# Patient Record
Sex: FEMALE | Employment: FULL TIME | URBAN - METROPOLITAN AREA
[De-identification: names, ages, dates, MRNs, and addresses within clinical notes are randomized per-mention and may not be internally consistent; named-entity substitution may affect disease eponyms.]

---

## 2017-07-26 ENCOUNTER — OFFICE VISIT - HEALTHEAST (OUTPATIENT)
Dept: FAMILY MEDICINE | Facility: CLINIC | Age: 47
End: 2017-07-26

## 2017-07-26 ENCOUNTER — RECORDS - HEALTHEAST (OUTPATIENT)
Dept: GENERAL RADIOLOGY | Facility: CLINIC | Age: 47
End: 2017-07-26

## 2017-07-26 DIAGNOSIS — E78.5 HYPERLIPIDEMIA: ICD-10-CM

## 2017-07-26 DIAGNOSIS — M25.562 PAIN IN LEFT KNEE: ICD-10-CM

## 2017-07-26 DIAGNOSIS — Z90.49 HX OF CHOLECYSTECTOMY: ICD-10-CM

## 2017-07-26 DIAGNOSIS — M25.561 PAIN IN RIGHT KNEE: ICD-10-CM

## 2017-07-26 DIAGNOSIS — M25.561 BILATERAL KNEE PAIN: ICD-10-CM

## 2017-07-26 DIAGNOSIS — M25.562 BILATERAL KNEE PAIN: ICD-10-CM

## 2017-07-26 LAB — LDLC SERPL CALC-MCNC: 118 MG/DL

## 2017-07-26 ASSESSMENT — MIFFLIN-ST. JEOR: SCORE: 1559.57

## 2017-08-01 ENCOUNTER — COMMUNICATION - HEALTHEAST (OUTPATIENT)
Dept: FAMILY MEDICINE | Facility: CLINIC | Age: 47
End: 2017-08-01

## 2018-12-19 ENCOUNTER — COMMUNICATION - HEALTHEAST (OUTPATIENT)
Dept: NURSING | Facility: CLINIC | Age: 48
End: 2018-12-19

## 2019-01-11 ENCOUNTER — COMMUNICATION - HEALTHEAST (OUTPATIENT)
Dept: NURSING | Facility: CLINIC | Age: 49
End: 2019-01-11

## 2021-05-31 VITALS — HEIGHT: 65 IN | WEIGHT: 212 LBS | BODY MASS INDEX: 35.32 KG/M2

## 2021-06-12 NOTE — PROGRESS NOTES
"Subjective: This patient comes in the clinic for the first time.  She is a 47-year-old female.  She has been at the [facility] prior to this.    Patient's has had previous surgery with cholecystectomy she has had 3 normal spontaneous vaginal deliveries.    She is a non-smoker no alcohol    She works at [employer] she is on her feet 8 hours a day.  She comes in today for evaluation regarding her knees.  She has bilateral knee pain.  X-ray today was obtained please see below.    Patient is not fasting she wanted cholesterol checked I did check LDL direct.  Came back at 118.    Patient's also had some pain in through her elbow right shoulder right medial epicondyles.    Regarding the knees it is worse on the left than the right.  I did get bilateral knee x-rays.    She has tried some over-the-counter naproxen which is helped a little bit.    Tobacco status: She  reports that she has never smoked. She has never used smokeless tobacco.    There are no active problems to display for this patient.      Current Outpatient Prescriptions   Medication Sig Dispense Refill     naproxen (NAPROSYN) 500 MG tablet Take 1 tablet (500 mg total) by mouth 2 (two) times a day with meals. 40 tablet 2     No current facility-administered medications for this visit.        ROS:   10 point review of systems positive as outlined otherwise negative    Objective:    /78 (Patient Site: Left Arm, Patient Position: Sitting, Cuff Size: Adult Large)  Pulse 80  Temp 97.9  F (36.6  C) (Oral)   Resp 20  Ht 5' 4.5\" (1.638 m)  Wt 212 lb (96.2 kg)  LMP  (Approximate)  BMI 35.83 kg/m2  Body mass index is 35.83 kg/(m^2).      General appearance no acute distress    Weight is 212    Vitals otherwise stable    Neck was negative supple oropharynx was clear    Lungs are clear no rales or rhonchi, heart regular S1-S2 rate is in the 80s    No abdominal pain    Joint exam no effusions no synovitis.  She has some pain with rotation movements of " the right anterior glenohumeral joint.  Also some tenderness over the right medial epicondyle.  I discussed lifting with palms to home.    Patient has some pain along the joint lines bilaterally.  X-ray showed some osteophytes and evidence of possible pseudogout, CPPD, versus atypical osteoarthritis.    LDL as outlined below    Results for orders placed or performed in visit on 07/26/17   LDL Cholesterol, Direct   Result Value Ref Range    Direct  <=129 mg/dl       Assessment:  1. Bilateral knee pain  XR Knees Bilateral 1 Or 2 VWS    naproxen (NAPROSYN) 500 MG tablet   2. Hx of cholecystectomy     3. Hyperlipidemia  LDL Cholesterol, Direct   4.  Mild right medial epicondylitis naproxen should help, appropriate lifting discussed.  5.  Right shoulder pain monitor  Bilateral knee pain possible pseudogout will treat with naproxen 500 mg twice daily consider cortisone injection.  If she stays on naproxen will need BMP.  She states should be coming in for a physical/Pap with 1 of the females here and can get the blood tests then.    LDL satisfactory continue good diet low-fat.    Previous cholecystectomy.            Plan: As outlined above    This transcription uses voice recognition software, which may contain typographical errors.

## 2021-06-22 NOTE — PROGRESS NOTES
Attempt 1:  Care guide left a message for the patient about [facility] enrollment.  If the patient is trying to call the Care guide back transfer them to Anthony Degroot at [phone].    The patient does not have a primary care provider, and the only encounters for the patient are showing with Dr. Clinton at [facility]. Care guide will try calling again.    Next Outreach: 12/26/18

## 2021-06-23 NOTE — PROGRESS NOTES
Attempt 1: Care Guide called patient.  If this patient is returning my call, please transfer to [facility] at ext [phone].      [facility] coverage enrollment     [facility]   [phone]  Clinic Care Coordinator

## 2021-11-02 ENCOUNTER — APPOINTMENT (OUTPATIENT)
Dept: INTERPRETER SERVICES | Facility: CLINIC | Age: 51
End: 2021-11-02

## 2021-11-17 ENCOUNTER — OFFICE VISIT (OUTPATIENT)
Dept: FAMILY MEDICINE | Facility: CLINIC | Age: 51
End: 2021-11-17

## 2021-11-17 VITALS
WEIGHT: 212 LBS | BODY MASS INDEX: 35.32 KG/M2 | OXYGEN SATURATION: 99 % | DIASTOLIC BLOOD PRESSURE: 79 MMHG | HEART RATE: 73 BPM | SYSTOLIC BLOOD PRESSURE: 150 MMHG | HEIGHT: 65 IN | RESPIRATION RATE: 16 BRPM

## 2021-11-17 DIAGNOSIS — M25.562 ACUTE PAIN OF LEFT KNEE: Primary | ICD-10-CM

## 2021-11-17 PROCEDURE — 99203 OFFICE O/P NEW LOW 30 MIN: CPT | Performed by: FAMILY MEDICINE

## 2021-11-17 RX ORDER — SODIUM PHOSPHATE,MONO-DIBASIC 19G-7G/118
1 ENEMA (ML) RECTAL DAILY
COMMUNITY

## 2021-11-17 RX ORDER — MELOXICAM 7.5 MG/1
7.5 TABLET ORAL DAILY PRN
Qty: 30 TABLET | Refills: 1 | Status: SHIPPED | OUTPATIENT
Start: 2021-11-17

## 2021-11-17 ASSESSMENT — MIFFLIN-ST. JEOR: SCORE: 1560.63

## 2021-11-17 NOTE — PROGRESS NOTES
"Assessment & Plan    1. Acute pain of left knee  This is a 52 yo female with left knee pain - no injury associated with the pain - xray from 2017 reviewed - mild changes.  Repeat xray today - midl degenerative changes.  Will treat with Meloxicam daily for pain.  Will refer for Ortho evaluation - patient notes she doesn't have insurance - will refer for care coordination help.    - XR Knee Left 3 Views; Future  - meloxicam (MOBIC) 7.5 MG tablet; Take 1 tablet (7.5 mg) by mouth daily as needed for pain  Dispense: 30 tablet; Refill: 1  - Orthopedic  Referral; Future  - Care Coordination Referral; Future      Return in about 4 weeks (around 12/15/2021) for if not getting better.    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  COMPARISON: None.     FINDINGS: Bilateral patellofemoral and lateral compartment osteophytes without significant joint space narrowing. This could represent CPPD, or atypical osteoarthritis.     No fracture or knee joint effusion.     This report was electronically interpreted by: Dr. Nasir Berrios MD ON 07/26/2017 at 16:12    Didn't take ibuprofen - because the pain was \"too big\" for ibuprofen  Did take it a couple days -       Knee Pain  Associated symptoms include arthralgias (left knee - no swelling).        There is no problem list on file for this patient.       No past medical history on file.     Current Outpatient Medications   Medication     glucosamine-chondroitin " "500-400 MG CAPS per capsule     meloxicam (MOBIC) 7.5 MG tablet     No current facility-administered medications for this visit.        No past surgical history on file.     Social History     Socioeconomic History     Marital status: Single     Spouse name: Not on file     Number of children: Not on file     Years of education: Not on file     Highest education level: Not on file   Occupational History     Not on file   Tobacco Use     Smoking status: Never Smoker     Smokeless tobacco: Never Used   Substance and Sexual Activity     Alcohol use: Not on file     Drug use: Not on file     Sexual activity: Not on file   Other Topics Concern     Not on file   Social History Narrative     Not on file     Social Determinants of Health     Financial Resource Strain: Not on file   Food Insecurity: Not on file   Transportation Needs: Not on file   Physical Activity: Not on file   Stress: Not on file   Social Connections: Not on file   Intimate Partner Violence: Not on file   Housing Stability: Not on file        No family history on file.     Review of Systems   Musculoskeletal: Positive for arthralgias (left knee - no swelling).   All other systems reviewed and are negative.       BP (!) 150/79 (BP Location: Left arm, Patient Position: Sitting, Cuff Size: Adult Large)   Pulse 73   Resp 16   Ht 1.64 m (5' 4.57\")   Wt 96.2 kg (212 lb)   SpO2 99%   BMI 35.75 kg/m       Physical Exam  Constitutional:       General: She is not in acute distress.     Appearance: She is well-developed.   HENT:      Right Ear: Tympanic membrane and external ear normal.      Left Ear: Tympanic membrane and external ear normal.      Nose: Nose normal.      Mouth/Throat:      Pharynx: No oropharyngeal exudate.   Eyes:      General:         Right eye: No discharge.         Left eye: No discharge.      Conjunctiva/sclera: Conjunctivae normal.      Pupils: Pupils are equal, round, and reactive to light.   Neck:      Thyroid: No thyromegaly.      " Trachea: No tracheal deviation.   Cardiovascular:      Rate and Rhythm: Normal rate and regular rhythm.      Pulses: Normal pulses.      Heart sounds: Normal heart sounds, S1 normal and S2 normal. No murmur heard.  No friction rub. No S3 or S4 sounds.    Pulmonary:      Effort: Pulmonary effort is normal. No respiratory distress.      Breath sounds: Normal breath sounds. No wheezing or rales.   Abdominal:      General: Bowel sounds are normal.      Palpations: Abdomen is soft. There is no mass.      Tenderness: There is no abdominal tenderness.   Musculoskeletal:         General: Normal range of motion.      Cervical back: Neck supple.      Comments: Left knee - joint line pain - pain with challenge to patella ; no effusion/swelling   Lymphadenopathy:      Cervical: No cervical adenopathy.   Skin:     General: Skin is warm and dry.      Findings: No rash.   Neurological:      Mental Status: She is alert and oriented to person, place, and time.      Motor: No abnormal muscle tone.      Deep Tendon Reflexes: Reflexes are normal and symmetric.   Psychiatric:         Thought Content: Thought content normal.         Judgment: Judgment normal.          Results:  Results for orders placed or performed in visit on 11/17/21   XR Knee Left 3 Views     Status: None    Narrative    EXAM: XR KNEE LEFT 3 VIEWS  LOCATION: [facility]  DATE/TIME: 11/17/2021 2:53 PM    INDICATION:  Acute pain of left knee  COMPARISON: None.      Impression    IMPRESSION: No fracture or joint effusion. Mild degenerative changes in the medial and lateral compartments. Moderate degenerative changes in the patellofemoral compartment.       Medications at Conclusion of Visit:  Current Outpatient Medications   Medication Sig Dispense Refill     glucosamine-chondroitin 500-400 MG CAPS per capsule Take 1 capsule by mouth daily       meloxicam (MOBIC) 7.5 MG tablet Take 1 tablet (7.5 mg) by mouth daily as needed for pain 30 tablet 1          KRISTINA VU MD

## 2021-11-17 NOTE — PATIENT INSTRUCTIONS
[facility]:    [address] [place]  [address]  Appointments: [phone]  OrthoQUICK: [phone] (don't need an appointment)  Mask required: All patients must wear a mask  bandana  or scarf before entering the building.

## 2021-11-18 ENCOUNTER — PATIENT OUTREACH (OUTPATIENT)
Dept: NURSING | Facility: CLINIC | Age: 51
End: 2021-11-18

## 2021-11-18 ASSESSMENT — PATIENT HEALTH QUESTIONNAIRE - PHQ9: SUM OF ALL RESPONSES TO PHQ QUESTIONS 1-9: 0

## 2021-11-18 NOTE — PROGRESS NOTES
"Clinic Care Coordination Contact  Community Health Worker Initial Outreach    Reason: referral to Clinic Care Coordination Service  Referral by: Stephani Handy MD  Note per referral reviewed:   \"Note    Additional Information:  patient doesn't have any insurance - is being referred for ORtho care .\"         name: Miah Flores  Agency: [facility]  Service    CHW Initial Information Gathering:  Referral Source: PCP  Preferred Hospital: Other (Patient stated; no preference.)  Preferred Urgent Care: [facility], [phone]  Current living arrangement:: Not Assessed (Patient is in the middle of work per pt. Not assessed due to pt time.)  No PCP office visit in Past Year: No  Transportation means:: Regular car (Patient drive to medical appt per pt reported on 11/18/2021.)     Patient outreach:   Patient was identified as a potential candidate and referred to Clinic Care Coordination Service. I call and spoke with patient today, 11- to discuss possible clinic care coordination enrollment. Have introduced myself to patient. Clinic care coordination service was described to the patient and immediate needs were discussed. The patient has agreed enrollment into CCC service. Patient is aware of [facility] monthly outreach.     Patient accepts CC: Yes. Patient scheduled for assessment with [facility]  on 11- at 3:00PM. Patient noted desire to discuss health care insurance program. Note/comment: pt is aware appt via phone visit and SW will call her at appt time. Pt confirmed.      "

## 2021-11-24 ENCOUNTER — APPOINTMENT (OUTPATIENT)
Dept: INTERPRETER SERVICES | Facility: CLINIC | Age: 51
End: 2021-11-24

## 2021-11-28 ASSESSMENT — ENCOUNTER SYMPTOMS: ARTHRALGIAS: 1

## 2021-12-01 ENCOUNTER — PATIENT OUTREACH (OUTPATIENT)
Dept: NURSING | Facility: CLINIC | Age: 51
End: 2021-12-01
Attending: FAMILY MEDICINE

## 2021-12-01 ENCOUNTER — PATIENT OUTREACH (OUTPATIENT)
Dept: CARE COORDINATION | Facility: CLINIC | Age: 51
End: 2021-12-01

## 2021-12-01 DIAGNOSIS — M25.562 ACUTE PAIN OF LEFT KNEE: ICD-10-CM

## 2021-12-01 SDOH — ECONOMIC STABILITY: FOOD INSECURITY: WITHIN THE PAST 12 MONTHS, THE FOOD YOU BOUGHT JUST DIDN'T LAST AND YOU DIDN'T HAVE MONEY TO GET MORE.: NEVER TRUE

## 2021-12-01 SDOH — ECONOMIC STABILITY: FOOD INSECURITY: WITHIN THE PAST 12 MONTHS, YOU WORRIED THAT YOUR FOOD WOULD RUN OUT BEFORE YOU GOT MONEY TO BUY MORE.: NEVER TRUE

## 2021-12-01 SDOH — HEALTH STABILITY: PHYSICAL HEALTH: ON AVERAGE, HOW MANY DAYS PER WEEK DO YOU ENGAGE IN MODERATE TO STRENUOUS EXERCISE (LIKE A BRISK WALK)?: 0 DAYS

## 2021-12-01 SDOH — ECONOMIC STABILITY: TRANSPORTATION INSECURITY
IN THE PAST 12 MONTHS, HAS LACK OF TRANSPORTATION KEPT YOU FROM MEETINGS, WORK, OR FROM GETTING THINGS NEEDED FOR DAILY LIVING?: NO

## 2021-12-01 SDOH — ECONOMIC STABILITY: INCOME INSECURITY: IN THE LAST 12 MONTHS, WAS THERE A TIME WHEN YOU WERE NOT ABLE TO PAY THE MORTGAGE OR RENT ON TIME?: NO

## 2021-12-01 SDOH — ECONOMIC STABILITY: TRANSPORTATION INSECURITY
IN THE PAST 12 MONTHS, HAS THE LACK OF TRANSPORTATION KEPT YOU FROM MEDICAL APPOINTMENTS OR FROM GETTING MEDICATIONS?: NO

## 2021-12-01 SDOH — HEALTH STABILITY: PHYSICAL HEALTH: ON AVERAGE, HOW MANY MINUTES DO YOU ENGAGE IN EXERCISE AT THIS LEVEL?: 0 MIN

## 2021-12-01 ASSESSMENT — SOCIAL DETERMINANTS OF HEALTH (SDOH)
WITHIN THE LAST YEAR, HAVE YOU BEEN AFRAID OF YOUR PARTNER OR EX-PARTNER?: NO
IN A TYPICAL WEEK, HOW MANY TIMES DO YOU TALK ON THE PHONE WITH FAMILY, FRIENDS, OR NEIGHBORS?: MORE THAN THREE TIMES A WEEK
WITHIN THE LAST YEAR, HAVE YOU BEEN KICKED, HIT, SLAPPED, OR OTHERWISE PHYSICALLY HURT BY YOUR PARTNER OR EX-PARTNER?: NO
HOW OFTEN DO YOU GET TOGETHER WITH FRIENDS OR RELATIVES?: MORE THAN THREE TIMES A WEEK
WITHIN THE LAST YEAR, HAVE YOU BEEN HUMILIATED OR EMOTIONALLY ABUSED IN OTHER WAYS BY YOUR PARTNER OR EX-PARTNER?: NO
HOW OFTEN DO YOU ATTENT MEETINGS OF THE CLUB OR ORGANIZATION YOU BELONG TO?: NEVER
HOW HARD IS IT FOR YOU TO PAY FOR THE VERY BASICS LIKE FOOD, HOUSING, MEDICAL CARE, AND HEATING?: SOMEWHAT HARD
WITHIN THE LAST YEAR, HAVE TO BEEN RAPED OR FORCED TO HAVE ANY KIND OF SEXUAL ACTIVITY BY YOUR PARTNER OR EX-PARTNER?: NO
HOW OFTEN DO YOU ATTEND CHURCH OR RELIGIOUS SERVICES?: NEVER
ARE YOU MARRIED, WIDOWED, DIVORCED, SEPARATED, NEVER MARRIED, OR LIVING WITH A PARTNER?: NEVER MARRIED
DO YOU BELONG TO ANY CLUBS OR ORGANIZATIONS SUCH AS CHURCH GROUPS UNIONS, FRATERNAL OR ATHLETIC GROUPS, OR SCHOOL GROUPS?: NO

## 2021-12-01 ASSESSMENT — ACTIVITIES OF DAILY LIVING (ADL): DEPENDENT_IADLS:: INDEPENDENT

## 2021-12-01 ASSESSMENT — LIFESTYLE VARIABLES
HOW OFTEN DO YOU HAVE A DRINK CONTAINING ALCOHOL: NEVER
HOW OFTEN DO YOU HAVE SIX OR MORE DRINKS ON ONE OCCASION: NEVER

## 2021-12-01 NOTE — PROGRESS NOTES
Clinic Care Coordination Contact  CCC CHAPIN contacted Ladonna by phone with an  to complete an assessment for enrollment into [facility].    Ladonna reported she doesn't have medical insurance and isn't able to get it through work. FRW referral completed.    CHAPIN completed Energy Assistance application. She will bring proof of income and energy bill to the clinic. CHAPIN will review and submit once it is received.     Ladonna was recommended to go to Ortho for her knee pain. She hasn't made an appointment yet because she doesn't have insurance. Once she has coverage she would like to schedule.       Clinic Care Coordination Contact  OUTREACH    Referral Information:  Referral Source: PCP    Primary Diagnosis: Psychosocial    Chief Complaint   Patient presents with     Clinic Care Coordination - Initial        Universal Utilization:   Clinic Utilization  Difficulty keeping appointments:: No  Compliance Concerns: No  No-Show Concerns: No  No PCP office visit in Past Year: No  Utilization    Hospital Admissions  0             ED Visits  0             No Show Count (past year)  0                Current as of: 11/28/2021  2:52 PM              Clinical Concerns:  Current Medical Concerns:  Knee pain    Current Behavioral Concerns: None reported    Education Provided to patient: Role of CCC   Pain  Pain (GOAL):: Yes  Type: Chronic (>3mo)  Location of chronic pain:: Knee  Health Maintenance Reviewed: Not assessed  Clinical Pathway: None    Medication Management:  Medication review status: Medications reviewed and no changes reported per patient.             Functional Status:  Dependent ADLs:: Independent  Dependent IADLs:: Independent  Bed or wheelchair confined:: No  Mobility Status: Independent  Fallen 2 or more times in the past year?: No  Any fall with injury in the past year?: No    Living Situation:  Current living arrangement:: I live alone (Has a daughter-away at school)  Type of residence:: Private home - stairs    Lifestyle  & Psychosocial Needs:    Social Determinants of Health     Tobacco Use: Low Risk      Smoking Tobacco Use: Never Smoker     Smokeless Tobacco Use: Never Used   Alcohol Use: Not At Risk     Frequency of Alcohol Consumption: Never     Average Number of Drinks: Not on file     Frequency of Binge Drinking: Never   Financial Resource Strain: Medium Risk     Difficulty of Paying Living Expenses: Somewhat hard   Food Insecurity: No Food Insecurity     Worried About Running Out of Food in the Last Year: Never true     Ran Out of Food in the Last Year: Never true   Transportation Needs: No Transportation Needs     Lack of Transportation (Medical): No     Lack of Transportation (Non-Medical): No   Physical Activity: Inactive     Days of Exercise per Week: 0 days     Minutes of Exercise per Session: 0 min   Stress: No Stress Concern Present     Feeling of Stress : Not at all   Social Connections: Socially Isolated     Frequency of Communication with Friends and Family: More than three times a week     Frequency of Social Gatherings with Friends and Family: More than three times a week     Attends Hinduism Services: Never     Active Member of Clubs or Organizations: No     Attends Club or Organization Meetings: Never     Marital Status: Never    Intimate Partner Violence: Not At Risk     Fear of Current or Ex-Partner: No     Emotionally Abused: No     Physically Abused: No     Sexually Abused: No   Depression: Not at risk     PHQ-2 Score: 0   Housing Stability: Low Risk      Unable to Pay for Housing in the Last Year: No     Number of Places Lived in the Last Year: 1     Unstable Housing in the Last Year: No     Diet:: Regular  Inadequate nutrition (GOAL):: No  Tube Feeding: No  Inadequate activity/exercise (GOAL):: No  Significant changes in sleep pattern (GOAL): No  Transportation means:: Regular car (Patient drive to medical appt per pt reported on 11/18/2021.)     Hinduism or spiritual beliefs that impact  treatment:: No  Mental health DX:: No  Mental health management concern (GOAL):: No  Chemical Dependency Status: No Current Concerns             Resources and Interventions:  Current Resources:         Supplies used at home:: None  Equipment Currently Used at Home: none  Employment Status: employed full-time         Advance Care Plan/Directive  Advanced Care Plans/Directives on file:: No  Advanced Care Plan/Directive Status: Not Applicable          Goals:   Goals        General     Financial Wellbeing (pt-stated)      Notes - Note created  12/1/2021  2:42 PM by Srini Oconnor LICSW     Goal Statement: I want to apply for medical insurance within 1-2 months   Date Goal set: 12/01/21  Barriers: Language  Strengths:   Date to Achieve By: 1/30/2022  Patient expressed understanding of goal: Yes  Action steps to achieve this goal:  1. I will work with FRW         Financial Wellbeing (pt-stated)      Notes - Note created  12/1/2021  2:43 PM by Srini Oconnor LICSW     Goal Statement: I want to apply for Energy Assistance within one month  Date Goal set: 12/01/21  Barriers: Language  Strengths:   Date to Achieve By: 12/30/2022  Patient expressed understanding of goal: Yes  Action steps to achieve this goal:  1. I will bring in my paystubs and energy bill  2. I will review and sign the application  3. [facility] will review documents and submit          Medical (pt-stated)      Notes - Note created  12/1/2021  2:43 PM by Srini Oconnor LICSW     Goal Statement: I want to schedule an ortho appointment within 1-3 months  Date Goal set: 12/01/21  Barriers: No insurance  Strengths:   Date to Achieve By: 2/28/2022  Patient expressed understanding of goal: Yes  Action steps to achieve this goal:  1. I will with FRW to get medical insurance  2. I will schedule an appointment once I have insurance              Patient/Caregiver understanding: Reported understanding     Outreach Frequency: monthly      Plan: Standard  outreach    and Financial Resource Worker Screening    [facility] Benefits  Is patient requesting help applying for [facility] benefits?: No    Insurance:  [place] insurance?: No  Is this an insurance renewal?: No  Is this a new insurance application request?: Yes  Have you recently applied for insurance?: No  How many people in your household? : 1  Do you file taxes?: Yes  How many dependents do you claim?: 0  What is the monthly gross income for the household (wages, social security, workers comp, and pension)? :  ($15 per hour, on average 40 hours per week. sometimes less)    Any other information for the FRW?: Seeking medical insurance - reproted employer doesn't offer any. May need help for [facility] if insurance can't cover balance

## 2021-12-01 NOTE — LETTER
[facility]  Patient Centered Plan of Care  About Me:        Patient Name:  Ladonna Lacy    YOB: 1970  Age:         51 year old   [place] MRN:    [id number] Telephone Information:  Home Phone [phone]   Mobile [phone]       Address:  [address]  [place] [address] Email address:  No e-mail address on record      Emergency Contact(s)    Name Relationship Lgl Grd Work Phone Home Phone Mobile Phone   1. MANDI ANNA Other   [phone]            Primary language:  Armenian     needed? Yes   [facility]:  [phone] op. 1  Other communication barriers:Language barrier    Preferred Method of Communication:     Current living arrangement: I live alone (Has a daughter-away at school)    Mobility Status/ Medical Equipment: Independent        Health Maintenance  Health Maintenance Reviewed: Not assessed      My Access Plan  Medical Emergency 911   Primary Clinic Line   - [phone]   24 Hour Appointment Line [phone] or  [phone]) (toll-free)   24 Hour Nurse Line [phone] (toll-free)   Preferred Urgent Care [facility], [phone]     Preferred Hospital Other (Patient stated; no preference.)     Preferred Pharmacy [id number] - [place], [place] - [address] [facility] [address]     Behavioral Health Crisis Line The National Suicide Prevention Lifeline at 1-656.641.5119 or 911             My Care Team Members  Patient Care Team       Relationship Specialty Notifications Start End    System, Provider Not In PCP - General Clinic  11/2/21           Diana Laguna MA Financial Resource Worker   12/1/21     Srini Oconnor LICSW Lead Care Coordinator Primary Care - CC Admissions 12/1/21     Juliet Calzada MA Community Health Worker  Admissions 12/1/21             My Care Plans  Self Management and Treatment Plan  Goals and (Comments)  Goals        General     Financial  Wellbeing (pt-stated)      Notes - Note created  12/1/2021  2:42 PM by Srini Oconnor LICSW     Goal Statement: I want to apply for medical insurance within 1-2 months   Date Goal set: 12/01/21  Barriers: Language  Strengths:   Date to Achieve By: 1/30/2022  Patient expressed understanding of goal: Yes  Action steps to achieve this goal:  1. I will work with FRW         Financial Wellbeing (pt-stated)      Notes - Note created  12/1/2021  2:43 PM by Srini Oconnor LICSW     Goal Statement: I want to apply for Energy Assistance within one month  Date Goal set: 12/01/21  Barriers: Language  Strengths:   Date to Achieve By: 12/30/2022  Patient expressed understanding of goal: Yes  Action steps to achieve this goal:  1. I will bring in my paystubs and energy bill  2. I will review and sign the application  3. [facility] will review documents and submit          Medical (pt-stated)      Notes - Note created  12/1/2021  2:43 PM by Srini Oconnor LICSW     Goal Statement: I want to schedule an ortho appointment within 1-3 months  Date Goal set: 12/01/21  Barriers: No insurance  Strengths:   Date to Achieve By: 2/28/2022  Patient expressed understanding of goal: Yes  Action steps to achieve this goal:  1. I will with FRW to get medical insurance  2. I will schedule an appointment once I have insurance               Action Plans on File:                       Advance Care Plans/Directives Type:   No data recorded    My Medical and Care Information  Problem List   There is no problem list on file for this patient.     Current Medications and Allergies:  See printed Medication Report.    Care Coordination Start Date: 12/1/2021   Frequency of Care Coordination: monthly     Form Last Updated: 12/01/2021

## 2021-12-01 NOTE — LETTER
[facility] CARE COORDINATION    December 1, 2021    Ladonna Lacy  [address]  [address]  [place] [address]      Dear Ladonna,    I am a clinic care coordinator who works with Provider Not In System at [facility]. I wanted to thank you for spending the time to talk with me.  Below is a description of clinic care coordination and how I can further assist you.      The clinic care coordination team is made up of a registered nurse,  and community health worker who understand the health care system. The goal of clinic care coordination is to help you manage your health and improve access to the health care system in the most efficient manner. The team can assist you in meeting your health care goals by providing education, coordinating services, strengthening the communication among your providers and supporting you with any resource needs.    Please feel free to contact the Community Health Worker at [phone] with any questions or concerns. We are focused on providing you with the highest-quality healthcare experience possible and that all starts with you.     Sincerely,     Srini Oconnor, [facility] Hospital[facility]    Enclosed: I have enclosed a copy of the Patient Centered Plan of Care. This has helpful information and goals that we have talked about. Please keep this in an easy to access place to use as needed.

## 2021-12-01 NOTE — PROGRESS NOTES
Clinic Care Coordination Contact  Program:  County:  [facility] Case #:  [facility] Worker:   Olga #:   Subscriber #:   Renewal:  Date Applied:     FRW Outreach:   12/3/2021: FRW called patient and stated a Paper application. The  FRW called [facility] and Patient is not isn't eligible for insurance FRW sent up an on appointment for 12/7/2021 to complete santa care application   12/2/2021:  FRW called and completed Insurance screening. FRW is going to call patient to complete [place] and santa care application on 12/3.     Health Insurance Screening: MNSURE   1. Do you currently have health insurance, did you receive a renewal? No  2. If you applied through [place]ure, do you know your username/password? No  3. How many people in the household? 1  4. Do you file taxes, who do you file with? Yes, NO  5. What is your monthly income (include all tax members)?   6. Do you have access to insurance through an employer (if yes, need EIN)? NO  7. Do you have social security cards and/or green cards? NO     12/1/21 FRW waiting for SW assessment notes to be completed FRW to Follow up tomorrow 12/2/21.     Health Insurance:      Referral/Screening:   Financial Resource Worker Screening     [facility] Benefits  Is patient requesting help applying for [facility] benefits?: No     [place]:  [place]  Is this an insurance renewal?: No  Is this a new insurance application request?: Yes  Have you recently applied for insurance?: No  How many people in your household? : 1  Do you file taxes?: Yes  How many dependents do you claim?: 0  What is the monthly gross income for the household (wages, social security, workers comp, and pension)? :  ($15 per hour, on average 40 hours per week. sometimes less) monthly 2,400     Any other information for the FRW?: Seeking medical insurance - reproted employer doesn't offer any. May need help for Santa Care if insurance can't cover balance       Goals Addressed:

## 2021-12-02 ENCOUNTER — ALLIED HEALTH/NURSE VISIT (OUTPATIENT)
Dept: NURSING | Facility: CLINIC | Age: 51
End: 2021-12-02

## 2021-12-02 DIAGNOSIS — Z71.89 COUNSELING AND COORDINATION OF CARE: Primary | ICD-10-CM

## 2021-12-02 PROCEDURE — 99207 PR NO CHARGE NURSE ONLY: CPT

## 2021-12-02 NOTE — PROGRESS NOTES
12/2/2021  Clinic Care Coordination Contact    Community Health Worker Follow Up    Care Gaps:     Health Maintenance Due   Topic Date Due     PREVENTIVE CARE VISIT  Never done     ADVANCE CARE PLANNING  Never done     COLORECTAL CANCER SCREENING  Never done     COVID-19 Vaccine (1) Never done     HIV SCREENING  Never done     HEPATITIS C SCREENING  Never done     PAP  Never done     DTAP/TDAP/TD IMMUNIZATION (1 - Tdap) Never done     LIPID  Never done     ZOSTER IMMUNIZATION (1 of 2) Never done     MAMMO SCREENING  08/22/2018     INFLUENZA VACCINE (1) Never done       Care Gaps Last addressed on 12-2-21 did not discuss care gaps.    Goals:   Goals Addressed as of 12/2/2021 at 10:44 AM                    Today       Financial Wellbeing (pt-stated)   50%    Added 12/1/21 by Srini Oconnor, [facility]      Goal Statement: I want to apply for Energy Assistance within one month  Date Goal set: 12/01/21  Barriers: Language  Strengths:   Date to Achieve By: 12/30/2022  Patient expressed understanding of goal: Yes  Action steps to achieve this goal:  1. CCC SW will review the completed application and documents and submit   2. I will wait to hear from CC SW or energy program.    Updated: 12-2-21 AL              Intervention and Education during outreach:     Patient walk in and bought in check stubs for CC SW.  Met with patient and co worker David.  Patient gave verbal consent for David to interpret in [place].  Assisted and supported patient to fill out the [facility] information and questionnaires.  Patient signed and dated EA application.  Made copies of check stubs (November month) and electricity bills.  Stated she does not have social security number.  Made copy of EA application for patient to keep.  Thanked patient for coming in to drop off the document.  Informed patient CC SW will review the document and EA application and if he has questions he will call.    Thanked and appreciated David for his  support.    Routed to [facility] as FYI to review EA application and document.  EA application in [facility] mailbox.    Routed to BRITT Rodriguez as FYI

## 2021-12-03 ENCOUNTER — PATIENT OUTREACH (OUTPATIENT)
Dept: CARE COORDINATION | Facility: CLINIC | Age: 51
End: 2021-12-03

## 2021-12-07 ENCOUNTER — PATIENT OUTREACH (OUTPATIENT)
Dept: CARE COORDINATION | Facility: CLINIC | Age: 51
End: 2021-12-07

## 2021-12-07 NOTE — PROGRESS NOTES
Clinic Care Coordination Contact    Situation: Patient chart reviewed by care coordinator.    Background: CHAPIN completed chart review    Assessment: SW submitted Energy Assistance application on 12/06 by fax    Plan/Recommendations: Standard outreach

## 2021-12-07 NOTE — PROGRESS NOTES
Clinic Care Coordination Contact  Program: Santa Care Application   Date Applied:     FRW Outreach:   12/7/2021: FRW called to complete the Santa Care application over the phone FRW was unable to reach the patient and LV with call back information.   12/3/2021: FRW called patient and stated a Paper application. The  FRW called [facility] and Patient is not isn't eligible for insurance FRW sent up an on appointment for 12/7/2021 to complete santa care application   12/2/2021:  FRW called and completed Insurance screening. FRW is going to call patient to complete [place]ure and santa care application on 12/3.     Health Insurance Screening: MNSURE   1. Do you currently have health insurance, did you receive a renewal? No  2. If you applied through Mnsure, do you know your username/password? No  3. How many people in the household? 1  4. Do you file taxes, who do you file with? Yes, NO  5. What is your monthly income (include all tax members)?   6. Do you have access to insurance through an employer (if yes, need EIN)? NO  7. Do you have social security cards and/or green cards? NO     12/1/21 FRW waiting for SW assessment notes to be completed FRW to Follow up tomorrow 12/2/21.     Health Insurance:      Referral/Screening:   Financial Resource Worker Screening     [facility] Benefits  Is patient requesting help applying for [facility] benefits?: No     Insurance:  [place] No  Is this an insurance renewal?: No  Is this a new insurance application request?: Yes  Have you recently applied for insurance?: No  How many people in your household? : 1  Do you file taxes?: Yes  How many dependents do you claim?: 0  What is the monthly gross income for the household (wages, social security, workers comp, and pension)? :  ($15 per hour, on average 40 hours per week. sometimes less) monthly 2,400     Any other information for the FRW?: Seeking medical insurance - reproted employer doesn't offer any. May  need help for Alina Care if insurance can't cover balance       Goals Addressed:

## 2021-12-14 ENCOUNTER — APPOINTMENT (OUTPATIENT)
Dept: INTERPRETER SERVICES | Facility: CLINIC | Age: 51
End: 2021-12-14

## 2021-12-14 NOTE — PROGRESS NOTES
Clinic Care Coordination Contact  Financial Resource Worker Follow Up  Clinic Care Coordination Contact  Program:  County:  [facility] Case #:  [facility] Worker:   Olga #:   Subscriber #:   Renewal:  Date Applied:     FRW Outreach:   12/14/21: FRW called and completed Santa Care application with patient over the phone FRW mailed a copy to the patient.  12/3/2021: FRW called patient and stated a Paper application. The  FRW called [facility] and Patient is not isn't eligible for insurance FRW sent up an on appointment for 12/7/2021 to complete santa care application   12/2/2021:  FRW called and completed Insurance screening. FRW is going to call patient to complete MNsure and santa care application on 12/3.     Health Insurance Screening: MNSURE   1. Do you currently have health insurance, did you receive a renewal? No  2. If you applied through Mnsure, do you know your username/password? No  3. How many people in the household? 1  4. Do you file taxes, who do you file with? Yes, NO  5. What is your monthly income (include all tax members)?   6. Do you have access to insurance through an employer (if yes, need EIN)? NO  7. Do you have social security cards and/or green cards? NO     12/1/21 FRW waiting for SW assessment notes to be completed FRW to Follow up tomorrow 12/2/21.     Health Insurance:      Referral/Screening:   Financial Resource Worker Screening     [facility] Benefits  Is patient requesting help applying for [facility] benefits?: No     Insurance:  [place] No  Is this an insurance renewal?: No  Is this a new insurance application request?: Yes  Have you recently applied for insurance?: No  How many people in your household? : 1  Do you file taxes?: Yes  How many dependents do you claim?: 0  What is the monthly gross income for the household (wages, social security, workers comp, and pension)? :  ($15 per hour, on average 40 hours per week. sometimes less) monthly 2,400     Any other  information for the FRW?: Seeking medical insurance - reproted employer doesn't offer any. May need help for Santa Care if insurance can't cover balance       Goals Addressed:     Goals:   Goals Addressed as of 12/14/2021 at 1:20 PM        Financial Wellbeing (pt-stated)     Added 12/1/21 by Srini Oconnor, [facility]      Goal Statement: I want to apply for medical insurance within 1-2 months   Date Goal set: 12/01/21  Barriers: Language  Strengths:   Date to Achieve By: 1/30/2022  Patient expressed understanding of goal: Yes  Action steps to achieve this goal:  1. I will work with FRW  2. I will send in the income verification needed with my application that the FRW sent me for santa care.              Intervention and Education during outreach: N/A    FRW Plan: to follow up in 3/4 weeks

## 2022-01-04 ENCOUNTER — PATIENT OUTREACH (OUTPATIENT)
Dept: CARE COORDINATION | Facility: CLINIC | Age: 52
End: 2022-01-04

## 2022-01-04 NOTE — PROGRESS NOTES
Clinic Care Coordination Contact  Program: Santa Care Application   Date Applied:     FRW Outreach:  1/4/2022: Outreach attempted x 1. Left message on voicemail with call back information and requested return call.  Plan: FRW will call again within one week.    12/7/2021: FRW called to complete the Santa Care application over the phone   12/3/2021: FRW called patient and stated a Paper application. The  FRW called [facility] and Patient is not isn't eligible for insurance FRW sent up an on appointment for 12/7/2021 to complete santa care application   12/2/2021:  FRW called and completed Insurance screening. FRW is going to call patient to complete MNsure and santa care application on 12/3.     Health Insurance Screening: MNSURE   1. Do you currently have health insurance, did you receive a renewal? No  2. If you applied through Mnsure, do you know your username/password? No  3. How many people in the household? 1  4. Do you file taxes, who do you file with? Yes, NO  5. What is your monthly income (include all tax members)?   6. Do you have access to insurance through an employer (if yes, need EIN)? NO  7. Do you have social security cards and/or green cards? NO     12/1/21 FRW waiting for SW assessment notes to be completed FRW to Follow up tomorrow 12/2/21.     Health Insurance:      Referral/Screening:   Financial Resource Worker Screening     [facility] Benefits  Is patient requesting help applying for [facility] benefits?: No     Insurance:  [place] No  Is this an insurance renewal?: No  Is this a new insurance application request?: Yes  Have you recently applied for insurance?: No  How many people in your household? : 1  Do you file taxes?: Yes  How many dependents do you claim?: 0  What is the monthly gross income for the household (wages, social security, workers comp, and pension)? :  ($15 per hour, on average 40 hours per week. sometimes less) monthly 2,400     Any other  information for the FRW?: Seeking medical insurance - reproted employer doesn't offer any. May need help for Alina Care if insurance can't cover balance       Goals Addressed:

## 2022-01-07 NOTE — PROGRESS NOTES
"Clinic Care Coordination Contact:    Patient is due for CCC follow up.   Per pt chart reviewed; CCC FRW called and left a voice message for pt on 1- and \"Plan: FRW will call again within one week\" per CCC FRW notes reviewed.   Due to CCC team already attempted to connect with patient on the week of 1/4/2022, CHW will defer outreach follow up to 1-2 weeks.     Plan: 1-  "

## 2022-01-10 ENCOUNTER — PATIENT OUTREACH (OUTPATIENT)
Dept: CARE COORDINATION | Facility: CLINIC | Age: 52
End: 2022-01-10

## 2022-01-10 NOTE — PROGRESS NOTES
Clinic Care Coordination Contact    Situation: Patient chart reviewed by carecoordinator.    Background: SW completed chart review    Assessment: Patient has been unable to be reached by FRW    Plan/Recommendations: Standard Outreach

## 2022-01-12 ENCOUNTER — PATIENT OUTREACH (OUTPATIENT)
Dept: CARE COORDINATION | Facility: CLINIC | Age: 52
End: 2022-01-12

## 2022-01-12 ENCOUNTER — APPOINTMENT (OUTPATIENT)
Dept: INTERPRETER SERVICES | Facility: CLINIC | Age: 52
End: 2022-01-12

## 2022-01-12 NOTE — PROGRESS NOTES
Clinic Care Coordination Contact  Program: Santa Care Application   Date Applied:     FRW Outreach:  1/12/2022:  FRW called patient and she  received the santa care application and mailed and was able to send it back to billing. She stated that she has a call with someone in billing at 3pm to go over things. FRW to follow up in one week.   1/4/2022: Outreach attempted x 1. Left message on voicemail with call back information and requested return call.  Plan: FRW will call again within one week.    12/7/2021: FRW called to complete the Santa Care application over the phone   12/3/2021: FRW called patient and stated a Paper application. The  FRW called [facility] and Patient is not isn't eligible for insurance FRW sent up an on appointment for 12/7/2021 to complete santa care application   12/2/2021:  FRW called and completed Insurance screening. FRW is going to call patient to complete MNsure and santa care application on 12/3.     Health Insurance Screening: MNSURE   1. Do you currently have health insurance, did you receive a renewal? No  2. If you applied through Mnsure, do you know your username/password? No  3. How many people in the household? 1  4. Do you file taxes, who do you file with? Yes, NO  5. What is your monthly income (include all tax members)?   6. Do you have access to insurance through an employer (if yes, need EIN)? NO  7. Do you have social security cards and/or green cards? NO     12/1/21 FRW waiting for SW assessment notes to be completed FRW to Follow up tomorrow 12/2/21.     Health Insurance:      Referral/Screening:   Financial Resource Worker Screening     [facility] Benefits  Is patient requesting help applying for [facility] benefits?: No     [place]urance:  [place] No  Is this an insurance renewal?: No  Is this a new insurance application request?: Yes  Have you recently applied for insurance?: No  How many people in your household? : 1  Do you file taxes?:  Yes  How many dependents do you claim?: 0  What is the monthly gross income for the household (wages, social security, workers comp, and pension)? :  ($15 per hour, on average 40 hours per week. sometimes less) monthly 2,400     Any other information for the FRW?: Seeking medical insurance - reproted employer doesn't offer any. May need help for Alina Care if insurance can't cover balance       Goals Addressed:

## 2022-01-18 ENCOUNTER — PATIENT OUTREACH (OUTPATIENT)
Dept: NURSING | Facility: CLINIC | Age: 52
End: 2022-01-18

## 2022-01-18 NOTE — PROGRESS NOTES
Clinic Care Coordination Contact  Community Health Worker Follow Up    Care Gaps:   Health Maintenance Due   Topic Date Due     PREVENTIVE CARE VISIT  Never done     ADVANCE CARE PLANNING  Never done     COVID-19 Vaccine (1) Never done     COLORECTAL CANCER SCREENING  Never done     HIV SCREENING  Never done     HEPATITIS C SCREENING  Never done     PAP  Never done     DTAP/TDAP/TD IMMUNIZATION (1 - Tdap) Never done     LIPID  Never done     ZOSTER IMMUNIZATION (1 of 2) Never done     MAMMO SCREENING  08/22/2018     INFLUENZA VACCINE (1) Never done     PHQ-2  01/01/2022     Not address due to patient is currently working with [facility] FRW for Santa Care application and seeking advise to get health care insurance.     Goals:   Goals Addressed as of 1/18/2022 at 1:15 PM                    Today       Financial Wellbeing (pt-stated)   20%    [facility]      Goal Statement: I want to apply for medical insurance within 1-2 months.    Date Goal set: 12/01/21  Barriers: Language  Strengths:   Date to Achieve By: 1/30/2022  Patient expressed understanding of goal: Yes  Action steps to achieve this goal:  1. I will continue to work with FRW.  2. I will send in the income verification needed with my application that the FRW sent me for santa care.  3. I will wait to hear from [facility] FRW for further discussion and follow up.     (Updated: 1-)           Medical (pt-stated)   20%    Added 12/1/21 [facility]      Goal Statement: I want to schedule an ortho appointment within 1-3 months.    Date Goal set: 12/01/21  Barriers: No insurance  Strengths:   Date to Achieve By: 2/28/2022  Patient expressed understanding of goal: Yes  Action steps to achieve this goal:  1. I will seek FRW advise regarding to get medical insurance and find ways to handle my medical bill. Completed a visit with Ortho clinic and paying $200.00 out of pocket.   2. I will provide Ortho info detail with [facility]  "FRW at the next FRW outreach follow up.  3. I will wait to hear from [facility] FRW for further follow up.     (Updated: 1-)             name: Jennifer  Agency: [employer]  Office    Discussion:   [facility] CHW was able to connect with patient today through  service. Patient stated info below and request [facility] help getting health care insurance. CHW suggested pt continue to work with [facility] FRW for santa care application and seek further advise regarding to health care insurance and wait to hear from the energy assistance center for further assistance for the EA application status. CHW relayed FRW notes encounter 1- with patient. Pt is aware message send to FRW to connect with her for further advise per pt request.    Pt stated:  -Still having knee pain. Saw the Ortho team recently and referred by my PCP. (NOTE: patient did not shared date of visit with Ortho team). Paid $200.00 out of pocket in order to see the doctor with Ortho clinic. Have a \"$500.00\" medical bill here and needs help.   -Doing find and no other questions.     Per patient reviewed;   -Per FRW notes encounter dated 1- reviewed; FRW is working with patient regarding to santa care application. \"FRW to follow up in one week\" per FRW notes reviewed. Per notes reviewed dated \"12/3/2021: Patient is not isn't eligible for insurance FRW sent up an on appointment for 12/7/2021 to complete santa care application.\"   -Per SW notes encounter dated 12- reviewed; \" submitted Energy Assistance application on 12/06 by fax.\"  -Pt completed office visit appt with PCP on 11-. Ortho referral is placed.    CHW Next Follow-up: goals follow up. Offer folllow up appt with PCP per PCP notes 11/17/2021 reviewed; \"Return in about 4 weeks (around 12/15/2021) for if not getting better.\"    Outreach frequency: monthly    CHW Plan:   -Message route to [facility] FRW to connect with patient per pt request.   -CHW next " outreach follow up: 2-

## 2022-01-19 ENCOUNTER — PATIENT OUTREACH (OUTPATIENT)
Dept: CARE COORDINATION | Facility: CLINIC | Age: 52
End: 2022-01-19

## 2022-01-19 ENCOUNTER — APPOINTMENT (OUTPATIENT)
Dept: INTERPRETER SERVICES | Facility: CLINIC | Age: 52
End: 2022-01-19

## 2022-01-19 NOTE — PROGRESS NOTES
Clinic Care Coordination Contact  Program: Santa Care Application   Date Applied:     FRW Outreach:  1/19/2022: FRW called patient today and she stated that she was confused the last time we talked and that she did not turn in the Santa Care application and that she missed her call with the billing office and they did not leave a message. FRW informed patient that she will need to send in the Santa Care application in order to get some help with the bill. CHW to follow up on the Energy Assistance.  FRW will follow up in 2 weeks to see if patient handed in the Santa Care application.   1/12/2022:  FRW called patient and she  received the santa care application and mailed and was able to send it back to billing. She stated that she has a call with someone in billing at 3pm to go over things. FRW to follow up in one week.   1/4/2022: Outreach attempted x 1. Left message on voicemail with call back information and requested return call.  Plan: FRW will call again within one week.    12/7/2021: FRW called to complete the Santa Care application over the phone   12/3/2021: FRW called patient and stated a Paper application. The  FRW called [facility] and Patient is not isn't eligible for insurance FRW sent up an on appointment for 12/7/2021 to complete santa care application   12/2/2021:  FRW called and completed Insurance screening. FRW is going to call patient to complete [place] and santa care application on 12/3.     Health Insurance Screening: MNSURE   1. Do you currently have health insurance, did you receive a renewal? No  2. If you applied through Mnsure, do you know your username/password? No  3. How many people in the household? 1  4. Do you file taxes, who do you file with? Yes, NO  5. What is your monthly income (include all tax members)?   6. Do you have access to insurance through an employer (if yes, need EIN)? NO  7. Do you have social security cards and/or green cards? NO     12/1/21 FRW waiting for  SW assessment notes to be completed FRW to Follow up tomorrow 12/2/21.     Health Insurance:      Referral/Screening:   Financial Resource Worker Screening     [facility] Benefits  Is patient requesting help applying for county benefits?: No     [place]  [place]  Is this an insurance renewal?: No  Is this a new insurance application request?: Yes  Have you recently applied for insurance?: No  How many people in your household? : 1  Do you file taxes?: Yes  How many dependents do you claim?: 0  What is the monthly gross income for the household (wages, social security, workers comp, and pension)? :  ($15 per hour, on average 40 hours per week. sometimes less) monthly 2,400     Any other information for the FRW?: Seeking medical insurance - reproted employer doesn't offer any. May need help for Alina Care if insurance can't cover balance       Goals Addressed:

## 2022-01-25 ENCOUNTER — PATIENT OUTREACH (OUTPATIENT)
Dept: CARE COORDINATION | Facility: CLINIC | Age: 52
End: 2022-01-25

## 2022-01-25 NOTE — PROGRESS NOTES
Clinic Care Coordination Contact    Situation: Patient chart reviewed by care coordinator.    Background: SW completed chart review    Assessment: FRW in contact with patient     Plan/Recommendations: Standard outreach

## 2022-02-03 ENCOUNTER — PATIENT OUTREACH (OUTPATIENT)
Dept: CARE COORDINATION | Facility: CLINIC | Age: 52
End: 2022-02-03

## 2022-02-03 NOTE — PROGRESS NOTES
Clinic Care Coordination Contact  Program: Santa Care Application   Date Applied:     FRJOSIANE Outreach:  2/3/2022: FRW called patient and she stated that she mailed the santa Care application  it in on 1/31/2022. FRW to follow up in 2 weeks   1/19/2022: FRW called patient today and she stated that she was confused the last time we talked and that she did not turn in the Santa Care application and that she missed her call with the billing office and they did not leave a message. FRW informed patient that she will need to send in the Santa Care application in order to get some help with the bill. CHW to follow up on the Energy Assistance.  FRW will follow up in 2 weeks to see if patient handed in the Santa Care application.   1/12/2022:  FRW called patient and she  received the santa care application and mailed and was able to send it back to billing. She stated that she has a call with someone in billing at 3pm to go over things. FRW to follow up in one week.   1/4/2022: Outreach attempted x 1. Left message on voicemail with call back information and requested return call.  Plan: FRW will call again within one week.    12/7/2021: FRW called to complete the Santa Care application over the phone   12/3/2021: FRW called patient and stated a Paper application. The  FRW called [facility] and Patient is not isn't eligible for insurance FRW sent up an on appointment for 12/7/2021 to complete santa care application   12/2/2021:  FRW called and completed Insurance screening. FRW is going to call patient to complete [place] and santa care application on 12/3.     Health Insurance Screening: MNSURE   1. Do you currently have health insurance, did you receive a renewal? No  2. If you applied through [employer], do you know your username/password? No  3. How many people in the household? 1  4. Do you file taxes, who do you file with? Yes, NO  5. What is your monthly income (include all tax members)?   6. Do you have access to  insurance through an employer (if yes, need EIN)? NO  7. Do you have social security cards and/or green cards? NO     12/1/21 FRW waiting for SW assessment notes to be completed FRW to Follow up tomorrow 12/2/21.     Health Insurance:      Referral/Screening:   Financial Resource Worker Screening     [facility] Benefits  Is patient requesting help applying for [facility] benefits?: No     [place]:  [place] No  Is this an insurance renewal?: No  Is this a new insurance application request?: Yes  Have you recently applied for insurance?: No  How many people in your household? : 1  Do you file taxes?: Yes  How many dependents do you claim?: 0  What is the monthly gross income for the household (wages, social security, workers comp, and pension)? :  ($15 per hour, on average 40 hours per week. sometimes less) monthly 2,400     Any other information for the FRW?: Seeking medical insurance - reproted employer doesn't offer any. May need help for Alina Care if insurance can't cover balance       Goals Addressed:

## 2022-02-17 ENCOUNTER — PATIENT OUTREACH (OUTPATIENT)
Dept: CARE COORDINATION | Facility: CLINIC | Age: 52
End: 2022-02-17

## 2022-02-17 ENCOUNTER — APPOINTMENT (OUTPATIENT)
Dept: INTERPRETER SERVICES | Facility: CLINIC | Age: 52
End: 2022-02-17

## 2022-02-17 NOTE — PROGRESS NOTES
Clinic Care Coordination Contact  Program: Santa Care Application   Date Applied:     FRW Outreach:  2/17/2022 FRW called to see if the patient has sent her bank statements in that the billing department needs to complete the santa Care application. Patient stated that she is going to send them today FRW to follow up in 2-3 weeks.   2/3/2022: FRW called patient and she stated that she mailed the santa Care application  it in on 1/31/2022. FRW to follow up in 2 weeks   1/19/2022: FRW called patient today and she stated that she was confused the last time we talked and that she did not turn in the Santa Care application and that she missed her call with the billing office and they did not leave a message. FRW informed patient that she will need to send in the Santa Care application in order to get some help with the bill. CHW to follow up on the Energy Assistance.  FRW will follow up in 2 weeks to see if patient handed in the Santa Care application.   1/12/2022:  FRW called patient and she  received the santa care application and mailed and was able to send it back to billing. She stated that she has a call with someone in billing at 3pm to go over things. FRW to follow up in one week.   1/4/2022: Outreach attempted x 1. Left message on voicemail with call back information and requested return call.  Plan: FRW will call again within one week.    12/7/2021: FRW called to complete the Santa Care application over the phone   12/3/2021: FRW called patient and stated a Paper application. The  FRW called [facility] and Patient is not isn't eligible for insurance FRW sent up an on appointment for 12/7/2021 to complete santa care application   12/2/2021:  FRW called and completed Insurance screening. FRW is going to call patient to complete [place] and santa care application on 12/3.     Health Insurance Screening: MNSURE   1. Do you currently have health insurance, did you receive a renewal? No  2. If you applied  through [employer], do you know your username/password? No  3. How many people in the household? 1  4. Do you file taxes, who do you file with? Yes, NO  5. What is your monthly income (include all tax members)?   6. Do you have access to insurance through an employer (if yes, need EIN)? NO  7. Do you have social security cards and/or green cards? NO     12/1/21 FRW waiting for SW assessment notes to be completed FRW to Follow up tomorrow 12/2/21.     Health Insurance:      Referral/Screening:   Financial Resource Worker Screening     [facility] Benefits  Is patient requesting help applying for [facility] benefits?: No     Insurance:  [place] No  Is this an insurance renewal?: No  Is this a new insurance application request?: Yes  Have you recently applied for insurance?: No  How many people in your household? : 1  Do you file taxes?: Yes  How many dependents do you claim?: 0  What is the monthly gross income for the household (wages, social security, workers comp, and pension)? :  ($15 per hour, on average 40 hours per week. sometimes less) monthly 2,400     Any other information for the FRW?: Seeking medical insurance - reproted employer doesn't offer any. May need help for Alina Care if insurance can't cover balance       Goals Addressed:

## 2022-02-28 ENCOUNTER — PATIENT OUTREACH (OUTPATIENT)
Dept: NURSING | Facility: CLINIC | Age: 52
End: 2022-02-28

## 2022-02-28 NOTE — PROGRESS NOTES
Clinic Care Coordination Contact  Community Health Worker Follow Up    Care Gaps:   Health Maintenance Due   Topic Date Due     PREVENTIVE CARE VISIT  Never done     ADVANCE CARE PLANNING  Never done     COVID-19 Vaccine (1) Never done     COLORECTAL CANCER SCREENING  Never done     HIV SCREENING  Never done     HEPATITIS C SCREENING  Never done     PAP  Never done     DTAP/TDAP/TD IMMUNIZATION (1 - Tdap) Never done     LIPID  Never done     ZOSTER IMMUNIZATION (1 of 2) Never done     MAMMO SCREENING  08/22/2018     INFLUENZA VACCINE (1) Never done     PHQ-2  01/01/2022     Patient is aware to discuss care gaps with PCP at next office visit. Pt declined follow up appt with PCP at this time. Pt will connect PCP office if need per pt.    Goals:   Goals Addressed as of 3/3/2022 at 5:26 PM                    2/28/22 1/18/22       Medical (pt-stated)   30%  20%    Added 12/1/21 by Srini Oconnor, [facility]      Goal Statement: I want to schedule an ortho appointment within 1-3 months.    Date Goal set: 12/01/21  Barriers: No insurance  Strengths:   Date to Achieve By: 2/28/2022  Patient expressed understanding of goal: Yes  Action steps to achieve this goal:  1. I will seek FRW advise regarding to get medical insurance and find ways to handle my medical bill. Completed a visit with Ortho clinic and paying $200.00 out of pocket.   2. I will continue to work with [facility] FRW regarding to santa care application.  3. I will provide Ortho info detail with [facility] FRW at the next FRW outreach follow up.  4. I will update status with [facility] team.     (Updated: 2-)             ID#: [id number]  Agency: [employer]    Discussion:   [facility] CHW was able to connect with patient today through  service. Updated above goal. Pt shared info below. CHW suggested patient to continue to work with [facility] FRW, update status with [facility] team and may connect [facility] team for any additional resources need. Pt confirmed.    Patient shared;    -Hasn't receive health care insurance card from the [facility]. Will address this with FRW at next appt.  -Dropped off bank statement to address provided in the santa care application. Completed santa care application and submitted. No updates.   -Doing good. No other questions.    CHW Next Follow-up: Goal follow up. Offer follow up appt with PCP to discuss care gaps.     Outreach frequency: monthly    CHW Plan: 3-

## 2022-03-03 ENCOUNTER — PATIENT OUTREACH (OUTPATIENT)
Dept: CARE COORDINATION | Facility: CLINIC | Age: 52
End: 2022-03-03

## 2022-03-03 NOTE — PROGRESS NOTES
Clinic Care Coordination Contact  Program: Santa Care Application   Date Applied:     FRW Outreach:  3/3/2022 FRW checked and Santa care application was approved  At 100% and patient account has been updated. FRW is removing herself from the care team.   2/17/2022 FRW called to see if the patient has sent her bank statements in that the billing department needs to complete the santa Care application. Patient stated that she is going to send them today FRW to follow up in 2-3 weeks.   2/3/2022: FRW called patient and she stated that she mailed the santa Care application  it in on 1/31/2022. FRW to follow up in 2 weeks   1/19/2022: FRW called patient today and she stated that she was confused the last time we talked and that she did not turn in the Santa Care application and that she missed her call with the billing office and they did not leave a message. FRW informed patient that she will need to send in the Santa Care application in order to get some help with the bill. CHW to follow up on the Energy Assistance.  FRW will follow up in 2 weeks to see if patient handed in the Santa Care application.   1/12/2022:  FRW called patient and she  received the santa care application and mailed and was able to send it back to billing. She stated that she has a call with someone in billing at 3pm to go over things. FRW to follow up in one week.   1/4/2022: Outreach attempted x 1. Left message on voicemail with call back information and requested return call.  Plan: FRW will call again within one week.    12/7/2021: FRW called to complete the Santa Care application over the phone   12/3/2021: FRW called patient and stated a Paper application. The  FRW called [employer] and Patient is not isn't eligible for insurance FRW sent up an on appointment for 12/7/2021 to complete santa care application   12/2/2021:  FRW called and completed Insurance screening. FRW is going to call patient to complete [place] and santa  care application on 12/3.     Health Insurance Screening: MNSURE   1. Do you currently have health insurance, did you receive a renewal? No  2. If you applied through [employer], do you know your username/password? No  3. How many people in the household? 1  4. Do you file taxes, who do you file with? Yes, NO  5. What is your monthly income (include all tax members)?   6. Do you have access to insurance through an employer (if yes, need EIN)? NO  7. Do you have social security cards and/or green cards? NO     12/1/21 FRW waiting for SW assessment notes to be completed FRW to Follow up tomorrow 12/2/21.     Health Insurance:      Referral/Screening:   Financial Resource Worker Screening     [facility] Benefits  Is patient requesting help applying for [facility] benefits?: No     Insurance:  [place] No  Is this an insurance renewal?: No  Is this a new insurance application request?: Yes  Have you recently applied for insurance?: No  How many people in your household? : 1  Do you file taxes?: Yes  How many dependents do you claim?: 0  What is the monthly gross income for the household (wages, social security, workers comp, and pension)? :  ($15 per hour, on average 40 hours per week. sometimes less) monthly 2,400     Any other information for the FRW?: Seeking medical insurance - reproted employer doesn't offer any. May need help for Alina Care if insurance can't cover balance       Goals Addressed:

## 2022-03-15 ENCOUNTER — PATIENT OUTREACH (OUTPATIENT)
Dept: CARE COORDINATION | Facility: CLINIC | Age: 52
End: 2022-03-15

## 2022-03-15 NOTE — PROGRESS NOTES
Clinic Care Coordination Contact    Situation: Patient chart reviewed by care coordinator.    Background: SW completed chart review    Assessment: CHW and FRW in contact with patient     Plan/Recommendations: Standard outreach

## 2022-03-29 ENCOUNTER — PATIENT OUTREACH (OUTPATIENT)
Dept: NURSING | Facility: CLINIC | Age: 52
End: 2022-03-29

## 2022-03-29 NOTE — PROGRESS NOTES
Clinic Care Coordination Contact  [facility]/Voicemail    Reason: [facility] CHW Follow Up Called     name: Irma   Agency:  [employer]  [employer]     Clinical Data: Care Coordinator Outreach:  Outreach attempted x 1:  Left message on patient's voicemail with call back information and requested return call.  Plan: Care Coordinator will try to reach patient again in 3 weeks.

## 2022-04-29 ENCOUNTER — PATIENT OUTREACH (OUTPATIENT)
Dept: NURSING | Facility: CLINIC | Age: 52
End: 2022-04-29

## 2022-04-29 NOTE — PROGRESS NOTES
Clinic Care Coordination Contact    Community Health Worker Follow Up    Care Gaps:     Health Maintenance Due   Topic Date Due     PREVENTIVE CARE VISIT  Never done     ADVANCE CARE PLANNING  Never done     COVID-19 Vaccine (1) Never done     COLORECTAL CANCER SCREENING  Never done     HIV SCREENING  Never done     HEPATITIS C SCREENING  Never done     PAP  Never done     DTAP/TDAP/TD IMMUNIZATION (1 - Tdap) Never done     LIPID  Never done     ZOSTER IMMUNIZATION (1 of 2) Never done     MAMMO SCREENING  08/22/2018     INFLUENZA VACCINE (1) Never done     PHQ-2 (once per calendar year)  01/01/2022       Postponed to next CHW outreach      Goals:    Goals Addressed as of 4/29/2022 at 2:49 PM                    Today    2/28/22       Medical (pt-stated)   40%  30%    Added 12/1/21 by Srini Oconnor, [facility]      Goal Statement: I want to schedule an ortho appointment within 1-3 months.    Date Goal set: 12/01/21  Barriers: No insurance  Strengths:   Date to Achieve By: 2/28/2022  Patient expressed understanding of goal: Yes  Action steps to achieve this goal:  1. I will seek FRW advise regarding to get medical insurance and find ways to handle my medical bill. Completed a visit with Ortho clinic and paying $200.00 out of pocket.   2. I will continue to work with [facility] FRW regarding to santa care application.  3. I will provide Ortho info detail with [facility] FRW at the next FRW outreach follow up.  4. I will update status with [facility] team.     (Updated: 2-)                Intervention and Education during outreach:     Writer was able to connect with Patient and introduce self and follow up on goals    Patient stated that she was denied MA because she was over income and has since been reduced to working 2 days a week and has less income.     Writer completed FRW screening for Patient.     CHW Plan:     CHW will follow up in 2 weeks to see if Patient was able to connect with FRW and apply for MA.

## 2022-05-02 ENCOUNTER — PATIENT OUTREACH (OUTPATIENT)
Dept: CARE COORDINATION | Facility: CLINIC | Age: 52
End: 2022-05-02

## 2022-05-02 NOTE — PROGRESS NOTES
Clinic Care Coordination Contact  Program:  Health Insurance   County: [facility] Case #:  [facility] Worker:   Eulaliosteven #:   Subscriber #:   Renewal:  Date Applied:     FRW Outreach:  5/2/2022: Outreach attempted x 1. Left message on voicemail with call back information and requested return call.  Plan: FRW will call again within one week.     Health Insurance:    None     Referral/Screening:    County Benefits   Is patient requesting help applying for [facility] benefits? Yes   Have you recently applied for any county benefits? No   How many people in your household? 1  What is the monthly gross income for the household (wages, social security, workers comp, and pension)? 800     [place]:   [place] No   Is this an insurance renewal? No   Is this a new insurance application request? Yes   Have you recently applied for insurance? Yes   What date did you apply for insurance? not sure the date, originally did not qualify. Hours reduced at work and may qualify now. How many people in your household? 1   Do you file taxes? Yes       OTHER Any other information for the FRW? not sure the date, originally did not qualify. Hours reduced at work and may qualify now.     Financial Resource Worker Follow Up    Goals:       Intervention and Education during outreach: n/a    FRW Plan: FRW will follow up next week.

## 2022-05-05 ENCOUNTER — PATIENT OUTREACH (OUTPATIENT)
Dept: CARE COORDINATION | Facility: CLINIC | Age: 52
End: 2022-05-05

## 2022-05-09 ENCOUNTER — PATIENT OUTREACH (OUTPATIENT)
Dept: CARE COORDINATION | Facility: CLINIC | Age: 52
End: 2022-05-09

## 2022-05-09 NOTE — PROGRESS NOTES
Clinic Care Coordination Contact  Program:  Health Insurance   County: [facility] Case #:  [facility] Worker:   Olga #:   Subscriber #:   Renewal:  Date Applied:     FRW Outreach:  5/9/2022: FRW called patient and left a vm with call back information. FRW will make one more outreach in 1 week.   5/2/2022: Outreach attempted x 1. Left message on voicemail with call back information and requested return call.  Plan: FRW will call again within one week.     Health Insurance:    None     Referral/Screening:    [facility] Benefits   Is patient requesting help applying for [facility] benefits? Yes   Have you recently applied for any [facility] benefits? No   How many people in your household? 1  What is the monthly gross income for the household (wages, social security, workers comp, and pension)? 800     Insurance:   [place]? No   Is this an insurance renewal? No   Is this a new insurance application request? Yes   Have you recently applied for insurance? Yes   What date did you apply for insurance? not sure the date, originally did not qualify. Hours reduced at work and may qualify now. How many people in your household? 1   Do you file taxes? Yes       OTHER Any other information for the FRW? not sure the date, originally did not qualify. Hours reduced at work and may qualify now.     Financial Resource Worker Follow Up    Goals:       Intervention and Education during outreach: n/a    FRW Plan: FRW will make one more outreach in 1 week

## 2022-05-16 ENCOUNTER — PATIENT OUTREACH (OUTPATIENT)
Dept: CARE COORDINATION | Facility: CLINIC | Age: 52
End: 2022-05-16

## 2022-05-16 NOTE — PROGRESS NOTES
Clinic Care Coordination Contact  Program:  Health Insurance   County: [facility] Case #:  [facility] Worker:   Eulalioure #:   Subscriber #:   Renewal:  Date Applied:     FRW Outreach:  5/16/2022: FRW called patient and left a final vm with call back information as attempt x3 with no answer or return phone call. FRW will resolve the FRW episode and remove patient from panel. Please refer to FRW for future needs.   5/9/2022: FRW called patient and left a vm with call back information. FRW will make one more outreach in 1 week.   5/2/2022: Outreach attempted x 1. Left message on [employer] with call back information and requested return call.  Plan: FRW will call again within one week.     Health Insurance:    None     Referral/Screening:    County Benefits   Is patient requesting help applying for [facility] benefits? Yes   Have you recently applied for any [facility] benefits? No   How many people in your household? 1  What is the monthly gross income for the household (wages, social security, workers comp, and pension)? 800     Insurance:   [place]? No   Is this an insurance renewal? No   Is this a new insurance application request? Yes   Have you recently applied for insurance? Yes   What date did you apply for insurance? not sure the date, originally did not qualify. Hours reduced at work and may qualify now. How many people in your household? 1   Do you file taxes? Yes       OTHER Any other information for the FRW? not sure the date, originally did not qualify. Hours reduced at work and may qualify now.     Financial Resource Worker Follow Up    Goals:       Intervention and Education during outreach: n/a    FRW Plan: n/a

## 2022-05-27 ENCOUNTER — PATIENT OUTREACH (OUTPATIENT)
Dept: NURSING | Facility: CLINIC | Age: 52
End: 2022-05-27

## 2022-05-27 NOTE — PROGRESS NOTES
"Clinic Care Coordination Contact  UT/Voicemail    Reason: [facility] CHW Follow Up Called     ID#: [id number]   name: Shaylee  Agency: [employer]     Clinical Data: Care Coordinator Outreach:  Outreach attempted #1 and #2: Patient is due for [facility] follow up. CHW with  attempted to connect with today two times today and unable to reach patient at this time due to the pt phone number is not working.    Attempted #3: [facility] CHW with  also contact the patient emergency contact persona and no answer. Left a general voice message on patient's EC person Juan Carranzaves's voicemail with call back information and requested return call.    Per patient chart reviewed;   Per [facility] FRW encounter dated 5- notes reviewed;   \"Program:  Health Insurance   [facility]: [place]    FRW Outreach:  5/16/2022: FRW called patient and left a final vm with call back information as attempt x3 with no answer or return phone call. FRW will resolve the FRW episode and remove patient from panel. Please refer to FRW for future needs.\"    Plan: Care Coordinator will try to reach patient again in 2-3 weeks.           "

## 2022-06-08 ENCOUNTER — PATIENT OUTREACH (OUTPATIENT)
Dept: CARE COORDINATION | Facility: CLINIC | Age: 52
End: 2022-06-08

## 2022-06-08 ASSESSMENT — ACTIVITIES OF DAILY LIVING (ADL): DEPENDENT_IADLS:: INDEPENDENT

## 2022-06-08 NOTE — LETTER
[facility]  Patient Centered Plan of Care  About Me:        Patient Name:  Ladonna Lacy    YOB: 1970  Age:         52 year old   [facility] MRN:    [id number] Telephone Information:  Home Phone [phone]   Mobile [phone]       Address:  [address]  [place] [address] Email address:  No e-mail address on record      Emergency Contact(s)    Name Relationship Lgl Grd Work Phone Home Phone Mobile Phone   1. MANDI ANNA Other   [phone]            Primary language:  Icelandic     needed? Yes   [facility]:  [phone] op. 1  Other communication barriers:Language barrier    Preferred Method of Communication:     Current living arrangement: I live alone (Has a daughter-away at school)    Mobility Status/ Medical Equipment: Independent        Health Maintenance  Health Maintenance Reviewed: Not assessed      My Access Plan  Medical Emergency 911   Primary Clinic Line   - [phone]   24 Hour Appointment Line [phone] or  [phone]) (toll-free)   24 Hour Nurse Line [phone] (toll-free)   Preferred Urgent Care [facility], [phone]     Preferred Hospital Other (Patient stated; no preference.)     Preferred Pharmacy [id number] - [place], [place] - [address] [place] [address] AT [address]     Behavioral Health Crisis Line The National Suicide Prevention Lifeline at 1-718.168.2216 or 911             My Care Team Members  Patient Care Team       Relationship Specialty Notifications Start End    Stephani Handy MD PCP - General Family Medicine Admissions 12/2/21     Phone: [phone] Fax: [phone] [address] [facility] [address]    Stephani Handy MD Assigned PCP   11/5/21     Phone: [phone] Fax: [phone]         [address] [place] [address]    Juliet Calzada MA Community Health Worker  Admissions 12/1/21     Isabel Patterson MA  Community Health Worker Primary Care - CC  5/17/22             My Care Plans  Goals and (Comments)   Goals        General     Medical (pt-stated)      Notes - Note edited  3/3/2022  5:26 PM by Juliet Calzada MA     Goal Statement: I want to schedule an ortho appointment within 1-3 months.    Date Goal set: 12/01/21  Barriers: No insurance  Strengths:   Date to Achieve By: 2/28/2022  Patient expressed understanding of goal: Yes  Action steps to achieve this goal:  1. I will seek FRW advise regarding to get medical insurance and find ways to handle my medical bill. Completed a visit with Ortho clinic and paying $200.00 out of pocket.   2. I will continue to work with [facility] FRW regarding to santa care application.  3. I will provide Ortho info detail with [facility] FRW at the next FRW outreach follow up.  4. I will update status with [facility] team.     (Updated: 2-)                                 Advance Care Plans/Directives Type:   No data recorded    My Medical and Care Information  Problem List   There is no problem list on file for this patient.     Current Medications and Allergies:  See printed Medication Report.    Care Coordination Start Date: 12/1/2021   Frequency of Care Coordination: monthly     Form Last Updated: 06/08/2022

## 2022-06-08 NOTE — PROGRESS NOTES
"Care Coordination Clinician Chart Review     Situation: Patient chart reviewed by SW/care coordinator.?     Background: Initial assessment and enrollment to Care Coordination was 12-1-21.?? Patient centered goals were developed with participation from patient.? Lead CC handed patient off to CHW for continued outreach every 30 days.??     Assessment: Per chart review, patient outreach completed by CC CHW on 5-27-22 to check pt's progress toward goal of \"I want to schedule an ortho appt.\"? Patient is actively working to accomplish goal(s).? Patient's goal(s) remain(s) appropriate at this time.? Patient is due for updated Plan of Care.? Annual assessment will be due 12-1-22.     Goals        Medical (pt-stated)       Goal Statement: I want to schedule an ortho appointment within 1-3 months.    Date Goal set: 12/01/21  Barriers: No insurance  Strengths:   Date to Achieve By: 2/28/2022  Patient expressed understanding of goal: Yes  Action steps to achieve this goal:  1. I will seek FRW advise regarding to get medical insurance and find ways to handle my medical bill. Completed a visit with Ortho clinic and paying $200.00 out of pocket.   2. I will continue to work with [facility] FRW regarding to santa care application.  3. I will provide Ortho info detail with [facility] FRW at the next FRW outreach follow up.  4. I will update status with [facility] team.     (Updated: 2-)            ??     Plan/Recommendations: The patient will continue working with Care Coordination to achieve above goal of wanting to set up an ortho appt.? CHW will involve Lead CC as needed or if patient is ready to move to maintenance.? Lead CC will continue to monitor CHW s monthly outreaches and progress to goal(s) every 6 weeks.?     Plan of Care updated and sent to patient: Yes    Alexi Rosales, CHAPIN/DARWIN  [facility]    "

## 2022-06-16 ENCOUNTER — PATIENT OUTREACH (OUTPATIENT)
Dept: NURSING | Facility: CLINIC | Age: 52
End: 2022-06-16

## 2022-06-16 NOTE — PROGRESS NOTES
Clinic Care Coordination Contact  Community Health Worker Follow Up    Care Gaps:   Health Maintenance Due   Topic Date Due     PREVENTIVE CARE VISIT  Never done     ADVANCE CARE PLANNING  Never done     COVID-19 Vaccine (1) Never done     COLORECTAL CANCER SCREENING  Never done     HIV SCREENING  Never done     HEPATITIS C SCREENING  Never done     PAP  Never done     DTAP/TDAP/TD IMMUNIZATION (1 - Tdap) Never done     LIPID  Never done     ZOSTER IMMUNIZATION (1 of 2) Never done     PHQ-2 (once per calendar year)  01/01/2022     Addressed care gaps with patient today, 6-. Pt is currently working getting health care insurance. Pt will schedule appt with PCP to discuss care gaps details.     Goals:    Goals Addressed as of 6/16/2022 at 10:03 AM                    Today       Other (pt-stated)   10%    Added 6/16/22 by Juliet Calzada MA      Goal Statement: I needs help applying health care insurance through the [facility] within the next 2-8 weeks.      Date goal set: 6-  Measure of Success:  Barriers: language   Strengths: self   Date to Achieve By: 8-  Patient expressed understanding of goal: yes    Action steps to achieve this goal:  1. I will wait to hear from [facility] FRW to connect with me for further discussion and to apply health care insurance.   2. I will follow [facility] FRW instructions.   3. I will check my voicemail and return any missed called from [facility] team.     Note/comment:   -6/16/2022: [facility] FRW referral/screening completed. (CHW Juliet)             ID#: [id number]  Agency: Language Line    Financial Resource Worker (FRW) Screening:  [facility] Benefits:  Is patient requesting help applying for [facility] benefits?: Yes  Have you recently applied for any [facility] benefits?: No  How many people in your household?: 1  Do you buy/eat food together?: Yes  What is the monthly gross income for the household (wages, social security, workers comp, and pension)? : 800 (Patient reported; work 3 days a week.  "Hourly pay rate: $15.00. Work 7 hours per day.)  Insurance:  Is this a new insurance application request?: Yes  Have you recently applied for insurance?: No  How many people in your household? : 1  Any other information for the FRW?: Patient request help applying health care insurance.  Care Coordination team will tell patient:   Thank you for answering all the questions, based on screening questions, our Financial Resource Worker will reach out to you with additional questions and next steps.    Discussion:   [facility] CHW was able to connect with patient through  service. Pt request help applying health care insurance. CHW relayed [facility] FRW notes below with pt and encouraged pt to returning FRW called/voicemail or connect with CHW/[facility] team with any questions regarding to scheduling appt with [facility] team. Completed FRW referral/screeing above with pt. Pt add a new goal. Pt is aware to wait to hear from [facility] FRW. CHW verified pt preferences day of the week for FRW to call her. CHW verified pt employment status and living situation due to FRW screening questionnaire. Pt reported info below.      Patient reported:   -Employed 3 days a week. Work 7 hours per day from 9:00AM to 4:00PM. Off every Thursday and Friday.   -Patient preferences: Prefer FRW to call me on Thursday or Friday.   -Hourly rate: $15.00; monthly gross: $800.00  -Needs help apply health care insurance for myself only.   -Monthly rent/mortgage: $812.00; rent. Rent increased to $835.00 starting August 01, 2022.   -Live in a apartment. Received rent assistance through July 2022. Rent assistance end.   -Doing well. No other questions or concerns.     Patient chart reviewed;   Per [facility] FRW notes reviewed encounter dated 5-;   \"Program:  Health Insurance   County: [facility] Outreach:  5/16/2022: FRW called patient and left a final vm with call back information as attempt x3 with no answer or return phone call. FRW will resolve the FRW episode and " "remove patient from panel. Please refer to FRW for future needs.\"    CHW Next Follow-up: goals follow up     Outreach frequency: monthly     CHW Plan:   -Next CHW outreach plan: 7-  -[facility] FRW referral/screening sent. Message route to updates all [facility] FRW Nadira Olmstead, and Diana to connect with pt on Thursday or Friday per pt preferences above.    "

## 2022-06-17 ENCOUNTER — PATIENT OUTREACH (OUTPATIENT)
Dept: CARE COORDINATION | Facility: CLINIC | Age: 52
End: 2022-06-17

## 2022-06-17 NOTE — PROGRESS NOTES
Clinic Care Coordination Contact  Program:  County:  [facility] Case #:  [facility] Worker:   Olga #:   Subscriber #:   Renewal:  Date Applied:     [place] Outreach:   6/17/2022: FRW called patient and completed screening and patient is not eligible for insurance due to immigration status. FRW explained to patient that we can apply for emergency Medical insurance that would cover her in an emergency situations but she did not want to apply at this time. Patient was asking for insurance due to her ortho bill FRW advised patient to call the facility billing office and ask if they have a santa care program   Health Insurance Screening: OLGA   1. Do you currently have health insurance, did you receive a renewal? No   2. If you applied through [facility], do you know your username/password?  3. How many people in the household? 2  4. Do you file taxes, who do you file with? Yes   5. What is your monthly income (include all tax members)? 800.00  6. Do you have access to insurance through an employer (if yes, need EIN)?  7. Do you have social security cards and/or green cards? No       Health Insurance:      Referral/Screening:  FRW Screening    Row Name 06/16/22 1003         [facility] Benefits     Is patient requesting help applying for [facility] benefits? Yes         Have you recently applied for any [facility] benefits? No         How many people in your household? 1         Do you buy/eat food together? Yes         What is the monthly gross income for the household (wages, social security, workers comp, and pension)?  800  Patient reported; work 3 days a week. Hourly pay rate: $15.00. Work 7 hours per day.                   Insurance:     Is this a new insurance application request? Yes         Have you recently applied for insurance? No         How many people in your household?  1                   OTHER     Any other information for the FRW? Patient request help applying health care insurance.               Goals Addressed:

## 2022-07-25 ENCOUNTER — PATIENT OUTREACH (OUTPATIENT)
Dept: CARE COORDINATION | Facility: CLINIC | Age: 52
End: 2022-07-25

## 2022-07-25 NOTE — PROGRESS NOTES
Care Coordination Clinician Chart Review     Situation: Patient chart reviewed by care coordinator.?     Background: Initial assessment and enrollment to Care Coordination was 12/1/2021.??Patient centered goals were developed with participation from patient.? Lead CC handed patient off to CHW for continued outreach every 30 days.??     Assessment: Per chart review, patient outreach completed by CC CHW on 6/16/2022.? Patient is actively working to accomplish goal(s).? Patient's goal(s) remain(s) appropriate at this time.? Patient is not due for updated Plan of Care.? Annual assessment will be due 12/1/2023.      Goals        Medical (pt-stated)       Goal Statement: I want to schedule an ortho appointment within 1-3 months.    Date Goal set: 12/01/21  Barriers: No insurance  Strengths:   Date to Achieve By: 2/28/2022  Patient expressed understanding of goal: Yes  Action steps to achieve this goal:  1. I will seek FRW advise regarding to get medical insurance and find ways to handle my medical bill. Completed a visit with Ortho clinic and paying $200.00 out of pocket.   2. I will continue to work with [facility] FRW regarding to santa care application.  3. I will provide Ortho info detail with [facility] FRW at the next FRW outreach follow up.  4. I will update status with [facility] team.     (Updated: 2-)             Other (pt-stated)       Goal Statement: I needs help [facility] within the next 2-8 weeks.      Date goal set: 6-  Measure of Success:  Barriers: language   Strengths: self   Date to Achieve By: 8-  Patient expressed understanding of goal: yes    Action steps to achieve this goal:  1. I will wait to hear from [facility] FRW to connect with me for further discussion and to apply health care insurance.   2. I will follow [facility] FRW instructions.   3. I will check my voicemail and return any missed called from [facility] team.     Note/comment:   -6/16/2022: [facility] FRW  referral/screening completed. (CHW Juliet)          ??     Plan/Recommendations: The patient will continue working with Care Coordination to achieve above goal(s).? CHW will involve Lead CC as needed or if patient is ready to move to maintenance.? Lead CC will continue to monitor CHW s monthly outreaches and progress to goal(s) every 6 weeks.?     Plan of Care updated and sent to patient: No, not due

## 2022-08-08 ENCOUNTER — PATIENT OUTREACH (OUTPATIENT)
Dept: CARE COORDINATION | Facility: CLINIC | Age: 52
End: 2022-08-08

## 2022-08-08 NOTE — PROGRESS NOTES
Clinic Care Coordination Contact:  Community Health Worker Follow Up    Today's date: 8-    Care Gaps:   Health Maintenance Due   Topic Date Due     PREVENTIVE CARE VISIT  Never done     ADVANCE CARE PLANNING  Never done     COLORECTAL CANCER SCREENING  Never done     HIV SCREENING  Never done     HEPATITIS C SCREENING  Never done     PAP  Never done     DTAP/TDAP/TD IMMUNIZATION (1 - Tdap) Never done     LIPID  Never done     ZOSTER IMMUNIZATION (1 of 2) Never done     PHQ-2 (once per calendar year)  01/01/2022     COVID-19 Vaccine (4 - Booster for Moderna series) 05/12/2022     Not able to address this today due to patient's time. Defer to next [facility] outreach follow up.    Goals:    Goals Addressed as of 8/8/2022 at 3:31 PM                    Today    4/29/22       Medical (pt-stated)   40%  40%    Added 12/1/21 by Srini Oconnor, [facility]      Goal Statement: I want to schedule an ortho appointment within 1-3 months.    Date Goal set: 12/01/21  Barriers: No insurance  Strengths:   Date to Achieve By: 2/28/2022  Patient expressed understanding of goal: Yes  Action steps to achieve this goal:  1. I will seek FRW advise regarding to get medical insurance and find ways to handle my medical bill. Completed a visit with Ortho clinic and paying $200.00 out of pocket.   2. I will continue to work with [facility] FRW regarding to santa care application. (Completed 6/17/2022 per pt on 8/8/2022)  3. I will provide Ortho info detail with [facility] FRW at the next FRW outreach follow up. (Completed 6/17/2022 per pt on 8/8/2022)  4. I will call my CHW Juliet with Ortho billing office phone number and will call the ortho together to find out santa program availability at this time. Away from home at this time. (Updated: 8-)    (Updated: 8-)              Goal accomplished today per pt:   Goal Statement: I needs help applying health care insurance through the [facility] within the next 2-8 weeks.   Date goal set:  "6-  Date to Achieve By: 8-  Date goal completed: 8-     ID#: [id number]  Agency: Language Line    Patient Outreach Discussion:   CHW was a able to connect with patient through  service. Updated and completed one goal above with pt. CHW offer to coordinate with ortho billing with pt to find out santa care program and pt declined due to patient isn't home at the moment per pt. CHW suggested pt to follow up FRW Diana instructions and connect with CHW/CCC team for any additional resources need. Pt confirmed.      Patient shared:   -Completed and reviewed health care insurance with FRW on 6/17/22. Not eligible for health care insurance due to immigration status. Understand eligible guideline. Will follow FRW instructions to connect the Ortho billing dept for santa care program.  -Currently away from home. Don't have the ortho billing statement with me. Will connect with my CHW with ortho details.   -Doing well. No other need at this time.    Note/comment:   -6/16/2022: [facility] FRW referral/screening completed. (BRITT Chung)  Patient chart reviewed;   Per [facility] FRW notes reviewed encounter dated; 6/17/2022;   \"FRW Outreach:   6/17/2022: FRW called patient and completed screening and patient is not eligible for insurance due to immigration status. FRW explained to patient that we can apply for emergency Medical insurance that would cover her in an emergency situations but she did not want to apply at this time. Patient was asking for insurance due to her ortho bill FRW advised patient to call the facility billing office and ask if they have a santa care program.\"    CHW Next Follow-up: goal follow up     Outreach frequency: monthly     CHW Plan: 9-    "

## 2022-08-29 ENCOUNTER — PATIENT OUTREACH (OUTPATIENT)
Dept: CARE COORDINATION | Facility: CLINIC | Age: 52
End: 2022-08-29

## 2022-08-29 NOTE — PROGRESS NOTES
Clinic Care Coordination - Chart Review Only    Situation/Background: Patient chart reviewed by care coordinator related to [facility] conversion.    Assessment: Patient continues to be followed by Clinic Care Coordination.    Plan: Patient's chart updated to align with [facility] program for ongoing patient management.    BERNA Gonzalez

## 2022-09-27 ENCOUNTER — PATIENT OUTREACH (OUTPATIENT)
Dept: CARE COORDINATION | Facility: CLINIC | Age: 52
End: 2022-09-27

## 2022-09-27 NOTE — PROGRESS NOTES
Care Coordination Clinician Chart Review     Situation: Patient chart reviewed by care coordinator.?     Background: Initial assessment and enrollment to Care Coordination was 12/21/2021.?? Care plan(s) with patient-centered goal(s) were developed with participation from patient.? Lead CC handed patient off to CHW for continued outreach every 30 days.??     Assessment: Per chart review, patient outreach completed by CC CHW on 8/8/2022.? Patient is actively working to accomplish goal(s).? Patient's goal(s) remain(s) appropriate at this time.? Patient is due for updated Plan of Care.? Annual assessment will be due 12/21/2022.     Care Plan: General     Problem: Ortho Appointment     Goal: Ortho appointment scheduling     Start Date: 12/1/2021 Expected End Date: 2/28/2022    Note:     Goal Statement: I want to schedule an ortho appointment within 1-3 months.    Date Goal set: 12/01/21  Barriers: No insurance  Strengths:   Date to Achieve By: 2/28/2022  Patient expressed understanding of goal: Yes  Action steps to achieve this goal:  1. I will seek FRW advise regarding to get medical insurance and find ways to handle my medical bill. Completed a visit with Ortho clinic and paying $200.00 out of pocket.   2. I will continue to work with [facility] FRW regarding to santa care application. (Completed 6/17/2022 per pt on 8/8/2022)  3. I will provide Ortho info detail with [facility] FRW at the next FRW outreach follow up. (Completed 6/17/2022 per pt on 8/8/2022)  4. I will call my CHW Juliet with Ortho billing office phone number and will call the ortho together to find out santa program availability at this time. Away from home at this time. (Updated: 8-)    (Updated: 8-)                        Plan/Recommendations: The patient will continue working with Care Coordination to achieve above goal(s).? CHW will involve Lead CC as needed or if patient is ready to move to maintenance.? Lead CC will continue to monitor CHW s  monthly outreaches and progress to goal(s) every 6 weeks.    Plan of Care updated and sent to patient: Yes

## 2022-09-27 NOTE — LETTER
[facility]  Patient Centered Plan of Care  About Me:        Patient Name:  Ladonna Lacy    YOB: 1970  Age:         52 year old   [place] MRN:    [id number] Telephone Information:  Home Phone [phone]   Mobile [phone]       Address:  [address] [facility] [address] Email address:  No e-mail address on record      Emergency Contact(s)    Name Relationship Lgl Grd Work Phone Home Phone Mobile Phone   1. MANDI ANNA Other   [phone]            Primary language:  Mozambican     needed? Yes   [place] [employer] [facility]:  [phone] op. 1  Other communication barriers:Language barrier    Preferred Method of Communication:     Current living arrangement: I live alone (Has a daughter-away at school)    Mobility Status/ Medical Equipment: Independent        Health Maintenance  Health Maintenance Reviewed: Not assessed  Never   Done PREVENTIVE CARE VISIT (Yearly)        Never   Done ADVANCE CARE PLANNING (Every 5 Years)       Never   Done COLORECTAL CANCER SCREENING (COLONOSCOPY - Preferred) (Every 10 Years)       Never   Done HIV SCREENING (Once)       Never   Done HEPATITIS C SCREENING (Once)       Never   Done PAP (Every 3 Years)       Never   Done DTAP/TDAP/TD IMMUNIZATION (1 - Tdap)       Never   Done LIPID (Every 5 Years)       Never   Done ZOSTER IMMUNIZATION (1 of 2)       JAN 1 2022 PHQ-2 (once per calendar year) (Yearly, January to December)  Last completed: Nov 17, 2021     MAR 9   2022 COVID-19 Vaccine (4 - Booster for Moderna series)  Last completed: Jan 12, 2022     SEP 1   2022 INFLUENZA VACCINE (1)  Last completed: Oct 27, 2020         My Access Plan  Medical Emergency 911   Primary Clinic Line   - [phone]   24 Hour Appointment Line [phone] or  [phone]) (toll-free)   24 Hour Nurse Line [phone] (toll-free)   Preferred Urgent Care [facility], [phone]     Preferred Hospital Other  (Patient stated; no preference.)     Preferred Pharmacy [id number] - [place], [place] - [address]     Behavioral Health Crisis Line The National Suicide Prevention Lifeline at 1-803.871.3481 or Text/Call 402             My Care Team Members  Patient Care Team       Relationship Specialty Notifications Start End    Stephani Handy MD PCP - General Family Medicine Admissions 12/2/21     Phone: [phone] Fax: [phone]         [address] [facility] [address]    Stephani Handy MD Assigned PCP   11/5/21     Phone: [phone] Fax: [phone] [address] [facility] [address]    Juliet Calzada MA Community Health Worker  Admissions 12/1/21     Lisa Mcgarry LSW Lead Care Coordinator Primary Care - CC Admissions 12/1/21             My Care Plans  Self Management and Treatment Plan  Care Plan  Care Plan: General     Problem: Ortho Appointment     Goal: Ortho appointment scheduling     Start Date: 12/1/2021 Expected End Date: 2/28/2022    Note:     Goal Statement: I want to schedule an ortho appointment within 1-3 months.    Date Goal set: 12/01/21  Barriers: No insurance  Strengths:   Date to Achieve By: 2/28/2022  Patient expressed understanding of goal: Yes  Action steps to achieve this goal:  1. I will seek FRW advise regarding to get medical insurance and find ways to handle my medical bill. Completed a visit with Ortho clinic and paying $200.00 out of pocket.   2. I will continue to work with [facility] FRW regarding to santa care application. (Completed 6/17/2022 per pt on 8/8/2022)  3. I will provide Ortho info detail with [facility] FRW at the next FRW outreach follow up. (Completed 6/17/2022 per pt on 8/8/2022)  4. I will call my CHW Juliet with Ortho billing office phone number and will call the ortho together to find out santa program availability at this time. Away from home at this time. (Updated: 8-)    (Updated: 8-)                          Action Plans on File:                       Advance Care Plans/Directives Type:   No data recorded    My Medical and Care Information  Problem List   There is no problem list on file for this patient.     Current Medications and Allergies:  See printed Medication Report.    Care Coordination Start Date: 12/1/2021   Frequency of Care Coordination: monthly     Form Last Updated: 09/27/2022

## 2022-10-04 ENCOUNTER — PATIENT OUTREACH (OUTPATIENT)
Dept: CARE COORDINATION | Facility: CLINIC | Age: 52
End: 2022-10-04

## 2022-10-04 NOTE — PROGRESS NOTES
Clinic Care Coordination Contact  [facility]/Voicemail       Clinical Data: Care Coordinator Outreach  Outreach attempted x 1.  Left message on patient's voicemail with call back information and requested return call.  Plan: Care Coordinator will try to reach patient again in 10 business days.    BRITT Peters  [facility] Care Coordination  [facility]  [phone]

## 2022-10-18 ENCOUNTER — PATIENT OUTREACH (OUTPATIENT)
Dept: CARE COORDINATION | Facility: CLINIC | Age: 52
End: 2022-10-18

## 2022-10-18 NOTE — PROGRESS NOTES
Clinic Care Coordination Contact:  [facility]/Voicemail    Reason: [facility] CHW Follow Up      info:   Gricel dodd [facility]  Dept     Clinical Data: Care Coordinator Outreach:  Outreach attempted x 2: CHW attempted to connect with patient today and no answer. Left a general voice message on patient's voicemail with call back information and requested return call.    Patient chart reviewed;   CHW Betty completed attempted #1 on 10/04/2022.     Plan: Care Coordinator will try to reach patient again in 1 month.

## 2022-11-23 ENCOUNTER — PATIENT OUTREACH (OUTPATIENT)
Dept: CARE COORDINATION | Facility: CLINIC | Age: 52
End: 2022-11-23

## 2022-11-23 NOTE — PROGRESS NOTES
Clinic Care Coordination Contact  [facility]/Voicemail       Clinical Data: Care Coordinator Outreach  Outreach attempted x 3.  Left message on patient's voicemail with call back information and requested return call.  Plan: Care Coordinator will do no further outreaches at this time.    ** CHW routing to [facility] clinician to review for disenrollment.    BRITT Peters  [facility] Care Coordination  [facility]  [phone]

## 2022-12-06 ENCOUNTER — PATIENT OUTREACH (OUTPATIENT)
Dept: CARE COORDINATION | Facility: CLINIC | Age: 52
End: 2022-12-06

## 2022-12-06 NOTE — PROGRESS NOTES
Care Coordination Clinician Chart Review     Situation: Patient chart reviewed by care coordinator.?     Background: Initial assessment and enrollment to Care Coordination was 12/1/2021.?? Care plan(s) with patient-centered goal(s) were developed with participation from patient.? Lead CC handed patient off to CHW for continued outreach every 30 days.??     Assessment: Per chart review, patient outreach completed by CC CHW on 11/23/2022.? Patient is actively working to accomplish goal(s).? Patient's goal(s) remain(s) appropriate at this time.? Patient is due for updated Plan of Care.? Annual assessment will be due 12/1/2022 .     Care Plan: General     Problem: Ortho Appointment     Goal: Ortho appointment scheduling     Start Date: 12/1/2021 Expected End Date: 2/28/2022    Note:     Goal Statement: I want to schedule an ortho appointment within 1-3 months.    Date Goal set: 12/01/21  Barriers: No insurance  Strengths:   Date to Achieve By: 2/28/2022  Patient expressed understanding of goal: Yes  Action steps to achieve this goal:  1. I will seek FRW advise regarding to get medical insurance and find ways to handle my medical bill. Completed a visit with Ortho clinic and paying $200.00 out of pocket.   2. I will continue to work with [facility] FRW regarding to santa care application. (Completed 6/17/2022 per pt on 8/8/2022)  3. I will provide Ortho info detail with [facility] FRW at the next FRW outreach follow up. (Completed 6/17/2022 per pt on 8/8/2022)  4. I will call my CHW Juliet with Ortho billing office phone number and will call the ortho together to find out santa program availability at this time. Away from home at this time. (Updated: 8-)    (Updated: 8-)                        Plan/Recommendations: The patient will continue working with Care Coordination to achieve above goal(s).? CHW will involve Lead CC as needed or if patient is ready to move to maintenance.? Lead CC will continue to monitor CHW s  monthly outreaches and progress to goal(s) every 6 weeks.    Plan of Care updated and sent to patient: Yes    BERNA Gonzalez  [facility]

## 2022-12-06 NOTE — LETTER
[facility]  Patient Centered Plan of Care  About Me:        Patient Name:  Ladonna Lacy    YOB: 1970  Age:         52 year old   [place] MRN:    [id number] Telephone Information:  Home Phone [phone]   Mobile [phone]       Address:  [address] [facility] [address] Email address:  No e-mail address on record      Emergency Contact(s)    Name Relationship Lgl Grd Work Phone Home Phone Mobile Phone   1. MANDI ANNA Other   [phone]            Primary language:  Libyan     needed? Yes   [place] [employer] [facility]:  [phone] op. 1  Other communication barriers:No data recorded  Preferred Method of Communication:     Current living arrangement: I live alone (Has a daughter-away at school)    Mobility Status/ Medical Equipment: Independent        Health Maintenance  Health Maintenance Reviewed: Due/overdue    Never   Done YEARLY PREVENTIVE VISIT (Yearly)      Never   Done ADVANCE CARE PLANNING (Every 5 Years)     Never   Done HEPATITIS B IMMUNIZATION (1 of 3 - 3-dose series)     Never   Done COLORECTAL CANCER SCREENING (COLONOSCOPY - Preferred) (Every 10 Years)     Never   Done HIV SCREENING (Once)     Never   Done HEPATITIS C SCREENING (Once)     Never   Done PAP (Every 3 Years)     Never   Done DTAP/TDAP/TD IMMUNIZATION (1 - Tdap)     Never   Done LIPID (Every 5 Years)     Never   Done ZOSTER IMMUNIZATION (1 of 2)     JAN 1 2022 PHQ-2 (once per calendar year) (Yearly, January to December)  Last completed: Nov 17, 2021     MAR 9   2022 COVID-19 Vaccine (4 - Booster for Moderna series)  Last completed: Jan 12, 2022         My Access Plan  Medical Emergency 911   Primary Clinic Line   - [phone]   24 Hour Appointment Line [phone] or  [phone]) (toll-free)   24 Hour Nurse Line [phone] (toll-free)   Preferred Urgent Care [facility], [phone]     Preferred Hospital Other (Patient stated; no  preference.)     Preferred Pharmacy [id number] - [place], [place] - [address]     Behavioral Health Crisis Line The National Suicide Prevention Lifeline at 1-135.843.5323 or Text/Call 023             My Care Team Members  Patient Care Team       Relationship Specialty Notifications Start End    Stephani Handy MD PCP - General Family Medicine Admissions 12/2/21     Phone: [phone] Fax: [phone]         [address] [facility] [id number]    Stephani Handy MD Assigned PCP   11/5/21     Phone: [phone] Fax: [phone] [address] [facility] [address]    Juliet Calzada MA Community Health Worker  Admissions 12/1/21     Lisa Mcgarry LSW Lead Care Coordinator Primary Care - CC Admissions 12/1/21             My Care Plans  Self Management and Treatment Plan  Care Plan  Care Plan: General     Problem: Ortho Appointment     Goal: Ortho appointment scheduling     Start Date: 12/1/2021 Expected End Date: 2/28/2022    Note:     Goal Statement: I want to schedule an ortho appointment within 1-3 months.    Date Goal set: 12/01/21  Barriers: No insurance  Strengths:   Date to Achieve By: 2/28/2022  Patient expressed understanding of goal: Yes  Action steps to achieve this goal:  1. I will seek FRW advise regarding to get medical insurance and find ways to handle my medical bill. Completed a visit with Ortho clinic and paying $200.00 out of pocket.   2. I will continue to work with [facility] FRW regarding to santa care application. (Completed 6/17/2022 per pt on 8/8/2022)  3. I will provide Ortho info detail with [facility] FRW at the next FRW outreach follow up. (Completed 6/17/2022 per pt on 8/8/2022)  4. I will call my CHW Juliet with Ortho billing office phone number and will call the ortho together to find out santa program availability at this time. Away from home at this time. (Updated: 8-)    (Updated: 8-)                          Action Plans on File:                       Advance Care Plans/Directives Type:   No data recorded    My Medical and Care Information  Problem List   There is no problem list on file for this patient.     Current Medications and Allergies:  See printed Medication Report.    Care Coordination Start Date: 12/1/2021   Frequency of Care Coordination: monthly     Form Last Updated: 12/06/2022

## 2022-12-20 ENCOUNTER — PATIENT OUTREACH (OUTPATIENT)
Dept: CARE COORDINATION | Facility: CLINIC | Age: 52
End: 2022-12-20

## 2022-12-20 NOTE — PROGRESS NOTES
Clinic Care Coordination Contact  [facility]       Clinical Data: Care Coordinator Outreach  Outreach attempted x 4. Phone rang out, did not go to voicemail per .  Plan: Care Coordinator will try to reach patient again in 10 business days.    BRITT Peters  [facility] Care Coordination  [facility]  [phone]

## 2023-01-02 ENCOUNTER — PATIENT OUTREACH (OUTPATIENT)
Dept: CARE COORDINATION | Facility: CLINIC | Age: 53
End: 2023-01-02

## 2023-01-02 NOTE — PROGRESS NOTES
Clinic Care Coordination Contact    Situation: Patient chart reviewed by care coordinator.    Background: CC SW receive notification from CHW pt has been unable to reach 3x    Assessment: Pt unable to reach 3x. CC SW determine appropriate to close [facility] enrollment    Plan/Recommendations: Pt disenrolled from [facility]. Disenrollment letter to be sent    BERNA Gonzalez [facility]